# Patient Record
Sex: FEMALE | Race: WHITE | NOT HISPANIC OR LATINO | Employment: OTHER | URBAN - METROPOLITAN AREA
[De-identification: names, ages, dates, MRNs, and addresses within clinical notes are randomized per-mention and may not be internally consistent; named-entity substitution may affect disease eponyms.]

---

## 2022-05-06 ENCOUNTER — HOSPITAL ENCOUNTER (EMERGENCY)
Facility: CLINIC | Age: 47
Discharge: HOME | End: 2022-05-06
Attending: NURSE PRACTITIONER | Admitting: NURSE PRACTITIONER
Payer: COMMERCIAL

## 2022-05-06 VITALS
WEIGHT: 138 LBS | OXYGEN SATURATION: 99 % | RESPIRATION RATE: 12 BRPM | SYSTOLIC BLOOD PRESSURE: 128 MMHG | TEMPERATURE: 99.2 F | DIASTOLIC BLOOD PRESSURE: 94 MMHG | BODY MASS INDEX: 24.45 KG/M2 | HEIGHT: 63 IN | HEART RATE: 63 BPM

## 2022-05-06 DIAGNOSIS — G89.29 CHRONIC NONINTRACTABLE HEADACHE: ICD-10-CM

## 2022-05-06 DIAGNOSIS — R51.9 CHRONIC NONINTRACTABLE HEADACHE: ICD-10-CM

## 2022-05-06 PROBLEM — Z87.19 HISTORY OF IBS: Status: ACTIVE | Noted: 2021-08-04

## 2022-05-06 PROBLEM — F11.21 OPIOID USE DISORDER, SEVERE, IN SUSTAINED REMISSION (H): Status: ACTIVE | Noted: 2021-06-17

## 2022-05-06 PROBLEM — S92.504D: Status: ACTIVE | Noted: 2021-06-17

## 2022-05-06 PROBLEM — H91.92 DEAFNESS IN LEFT EAR: Status: ACTIVE | Noted: 2021-06-17

## 2022-05-06 PROBLEM — F33.9 RECURRENT MAJOR DEPRESSIVE DISORDER (H): Status: ACTIVE | Noted: 2021-06-17

## 2022-05-06 PROBLEM — I10 HYPERTENSION: Status: ACTIVE | Noted: 2021-06-17

## 2022-05-06 PROBLEM — F43.9 TRAUMA AND STRESSOR-RELATED DISORDER: Status: ACTIVE | Noted: 2021-08-09

## 2022-05-06 PROBLEM — F15.21 METHAMPHETAMINE USE DISORDER, SEVERE, IN SUSTAINED REMISSION (H): Status: ACTIVE | Noted: 2021-07-19

## 2022-05-06 PROCEDURE — 96374 THER/PROPH/DIAG INJ IV PUSH: CPT

## 2022-05-06 PROCEDURE — 258N000003 HC RX IP 258 OP 636: Performed by: NURSE PRACTITIONER

## 2022-05-06 PROCEDURE — 250N000011 HC RX IP 250 OP 636: Performed by: NURSE PRACTITIONER

## 2022-05-06 PROCEDURE — 99284 EMERGENCY DEPT VISIT MOD MDM: CPT | Mod: 25

## 2022-05-06 PROCEDURE — 96375 TX/PRO/DX INJ NEW DRUG ADDON: CPT

## 2022-05-06 PROCEDURE — 96361 HYDRATE IV INFUSION ADD-ON: CPT

## 2022-05-06 RX ORDER — KETOROLAC TROMETHAMINE 15 MG/ML
15 INJECTION, SOLUTION INTRAMUSCULAR; INTRAVENOUS ONCE
Status: COMPLETED | OUTPATIENT
Start: 2022-05-06 | End: 2022-05-06

## 2022-05-06 RX ORDER — DICYCLOMINE HYDROCHLORIDE 10 MG/1
10 CAPSULE ORAL 3 TIMES DAILY
COMMUNITY

## 2022-05-06 RX ORDER — MULTIVITAMIN WITH IRON
1 TABLET ORAL DAILY
COMMUNITY

## 2022-05-06 RX ORDER — DIPHENHYDRAMINE HYDROCHLORIDE 50 MG/ML
25 INJECTION INTRAMUSCULAR; INTRAVENOUS ONCE
Status: COMPLETED | OUTPATIENT
Start: 2022-05-06 | End: 2022-05-06

## 2022-05-06 RX ORDER — METOCLOPRAMIDE HYDROCHLORIDE 5 MG/ML
10 INJECTION INTRAMUSCULAR; INTRAVENOUS ONCE
Status: COMPLETED | OUTPATIENT
Start: 2022-05-06 | End: 2022-05-06

## 2022-05-06 RX ORDER — LISINOPRIL 10 MG/1
10 TABLET ORAL DAILY
COMMUNITY

## 2022-05-06 RX ORDER — ATORVASTATIN CALCIUM 10 MG/1
10 TABLET, FILM COATED ORAL DAILY
COMMUNITY

## 2022-05-06 RX ORDER — TRAZODONE HYDROCHLORIDE 100 MG/1
100 TABLET ORAL AT BEDTIME
COMMUNITY

## 2022-05-06 RX ADMIN — METOCLOPRAMIDE 10 MG: 5 INJECTION, SOLUTION INTRAMUSCULAR; INTRAVENOUS at 19:29

## 2022-05-06 RX ADMIN — KETOROLAC TROMETHAMINE 15 MG: 15 INJECTION, SOLUTION INTRAMUSCULAR; INTRAVENOUS at 20:15

## 2022-05-06 RX ADMIN — DIPHENHYDRAMINE HYDROCHLORIDE 25 MG: 50 INJECTION, SOLUTION INTRAMUSCULAR; INTRAVENOUS at 19:30

## 2022-05-06 RX ADMIN — SODIUM CHLORIDE 1000 ML: 9 INJECTION, SOLUTION INTRAVENOUS at 19:29

## 2022-05-06 ASSESSMENT — ENCOUNTER SYMPTOMS
HEADACHES: 1
NECK STIFFNESS: 0
SEIZURES: 0
TROUBLE SWALLOWING: 0
ARTHRALGIAS: 0
WEAKNESS: 1
CONFUSION: 0
NAUSEA: 0
MYALGIAS: 0
NECK PAIN: 0
NUMBNESS: 0
DIZZINESS: 0
FATIGUE: 0
SHORTNESS OF BREATH: 0
CHEST TIGHTNESS: 0
LIGHT-HEADEDNESS: 0
ABDOMINAL PAIN: 0
VOICE CHANGE: 0
DYSURIA: 0
CHILLS: 0
FEVER: 0
TREMORS: 0
PHOTOPHOBIA: 1
SPEECH DIFFICULTY: 0

## 2022-05-06 NOTE — ED NOTES
Bed: FT01  Expected date:   Expected time:   Means of arrival:   Comments:  Ashlee [id number] migraine eta 1803

## 2022-05-06 NOTE — ED PROVIDER NOTES
History     Chief Complaint:  No chief complaint on file.       [facility]   Nora Lujan is a 47 year old female who presents with headache.  She notes gradually worsening headache at 03 a.m. today.  She notes headache is similar to previous headaches.  Out of trazodone x 1 week.  Presents via EMS from home.  The patient lives in an apartment with her mother.  She notes she has daily headaches that are 6/10.  She notes she occasionally (1-2 times/month) gets headaches severe like today as a 8-9/10.  She notes she falls frequently but has not struck her head and has no change in this status.  She has had no fevers.  She took ibuprofen and Benadryl earlier today without significant relief.  Of note, her sister has COVID currently but the patient has not been around her family.    Review of Systems   Constitutional: Negative for chills, fatigue and fever.   HENT: Negative for congestion, ear pain, tinnitus, trouble swallowing and voice change.    Eyes: Positive for photophobia. Negative for visual disturbance.   Respiratory: Negative for chest tightness and shortness of breath.    Cardiovascular: Negative for chest pain.   Gastrointestinal: Negative for abdominal pain and nausea.   Genitourinary: Negative for dysuria.   Musculoskeletal: Negative for arthralgias, myalgias, neck pain and neck stiffness.   Skin: Negative for rash.   Neurological: Positive for weakness (chronic unchanged generalized) and headaches. Negative for dizziness, tremors, seizures, syncope, speech difficulty, light-headedness and numbness.   Psychiatric/Behavioral: Negative for confusion.   All other systems reviewed and are negative.    Allergies:  Compazine [Prochlorperazine]  Inapsine [Droperidol]  Codeine     Medications:    atorvastatin (LIPITOR) 10 MG tablet  dicyclomine (BENTYL) 10 MG capsule  lisinopril (ZESTRIL) 10 MG tablet  magnesium 250 MG tablet  traZODone (DESYREL) 100 MG tablet        Past Medical History:    No past  "medical history on file.  Patient Active Problem List   Diagnosis     Allergic rhinitis     Anxiety     Asthma     Bilateral occipital neuralgia     Closed nondisplaced fracture of lesser toe of right foot with routine healing     Deafness in left ear     Esophageal reflux     Cervicalgia     Chronic nonintractable headache     Fibromyalgia     History of IBS     Hyperlipidemia     Hypertension     Intractable chronic migraine without aura     Methamphetamine use disorder, severe, in sustained remission (H)     Opioid use disorder, severe, in sustained remission (H)     Other syndromes affecting cervical region     Recurrent major depressive disorder (H)     Tobacco dependence     Trauma and stressor-related disorder     Weakness of left side of body        Past Surgical History:    No past surgical history on file.     Family History:    family history is not on file.    Social History:  PCP: Satish Hill, [facility]    Physical Exam   Patient Vitals for the past 24 hrs:   BP Temp Temp src Pulse Resp SpO2 Height Weight   05/06/22 1816 (!) 141/92 99.2  F (37.3  C) Temporal 71 23 99 % 1.6 m (5' 3\") 62.6 kg (138 lb)        Physical Exam  Nursing notes reviewed. Vitals reviewed.  General: Alert. Well kept.  Eyes:  Conjunctiva non-injected, non-icteric.  Neck/Throat: Moist mucous membranes.  Normal voice.  No nuchal rigidity.  Cardiac: Regular rhythm. Normal heart sounds with no murmur/rubs/click.   Pulmonary: Clear and equal breath sounds bilaterally. No crackles/rales. No wheezing  Abdomen: Soft. Non-distended. Non-tender to palpation. No masses. No guarding or rebound.  Musculoskeletal: Normal gross range of motion of all 4 extremities.    Neurological: Alert and oriented x4.  Cranial nerves II through XII intact.  Skin: Warm and dry without rashes or petechiae. Normal appearance of visualized exposed skin.  Psych: Affect normal. Good eye contact.    Emergency Department Course     Emergency " Department Course:  Reviewed:  I reviewed nursing notes, vitals, past medical history and Care Everywhere    Assessments:  1816 I obtained history and examined the patient as noted above.   2050 I rechecked the patient and explained findings.  She notes she is feeling improved and is requesting discharge home.    Interventions:  Medications   metoclopramide (REGLAN) injection 10 mg (10 mg Intravenous Given 5/6/22 1929)   diphenhydrAMINE (BENADRYL) injection 25 mg (25 mg Intravenous Given 5/6/22 1930)   0.9% sodium chloride BOLUS (0 mLs Intravenous Stopped 5/6/22 2123)   ketorolac (TORADOL) injection 15 mg (15 mg Intravenous Given 5/6/22 2015)      Disposition:  The patient was discharged to home.     Impression & Plan      Covid-19  Nora Lujan was evaluated during a global COVID-19 pandemic, which necessitated consideration that the patient might be at risk for infection with the SARS-CoV-2 virus that causes COVID-19.   Applicable protocols for evaluation were followed during the patient's care.     Medical Decision Making:  Nora Lujan is a 47 year old female who presents with headache, similar to previous migraines.  The patient has not had any fever, weakness, numbness, paresthesia, neck stiffness or confusion. Meningitis, subarachnoid hemorrhage, CNS tumor, and stroke are considered as part of the differential, and considered unlikely. Neurologic exam is without focal deficits.  Low concern for bacterial infectious etiology given duration of symptoms, well appearance, no fevers.   Low concern for subarachnoid hemorrhage given duration of symptoms, absence of neck stiffness, absence of neurologic findings.  No trauma.  No indication for imagery or lab work. The pain has improved with medication interventions and she is requesting discharge home.  The patient should follow-up with her primary physician or neurologist within 3 days.  Return if increasing pain, fever, neck stiffness,  confusion, vomiting or weakness.      Diagnosis:    ICD-10-CM    1. Chronic nonintractable headache  R51.9     G89.29         Discharge Medications:  New Prescriptions    No medications on file        5/6/2022   Dresden, Cecille, ANDRE        DresdenCecille, CNP  05/07/22 0027

## 2022-05-06 NOTE — ED TRIAGE NOTES
Arrives via EMS from home. Complains of migraine headache which she has had before. Tried ibuprofen and benadryl at home. Normally would use some trazadone but apparently ran out. Reports vomiting and generalized weakness. EMS reports [id number]     Triage Assessment     Row Name 05/06/22 8841       Triage Assessment (Adult)    Airway WDL WDL       Respiratory WDL    Respiratory WDL WDL       Skin Circulation/Temperature WDL    Skin Circulation/Temperature WDL WDL       Cardiac WDL    Cardiac WDL WDL       Cognitive/Neuro/Behavioral WDL    Cognitive/Neuro/Behavioral WDL X  headache, photophobic

## 2022-08-08 ENCOUNTER — HOSPITAL ENCOUNTER (EMERGENCY)
Facility: CLINIC | Age: 47
Discharge: HOME | End: 2022-08-08
Attending: EMERGENCY MEDICINE | Admitting: EMERGENCY MEDICINE
Payer: COMMERCIAL

## 2022-08-08 VITALS
HEART RATE: 16 BPM | DIASTOLIC BLOOD PRESSURE: 56 MMHG | OXYGEN SATURATION: 98 % | TEMPERATURE: 99.4 F | SYSTOLIC BLOOD PRESSURE: 102 MMHG | RESPIRATION RATE: 20 BRPM

## 2022-08-08 DIAGNOSIS — G43.009 NONINTRACTABLE MIGRAINE, UNSPECIFIED MIGRAINE TYPE: ICD-10-CM

## 2022-08-08 PROCEDURE — 99284 EMERGENCY DEPT VISIT MOD MDM: CPT | Mod: 25

## 2022-08-08 PROCEDURE — 250N000011 HC RX IP 250 OP 636: Performed by: EMERGENCY MEDICINE

## 2022-08-08 PROCEDURE — 96375 TX/PRO/DX INJ NEW DRUG ADDON: CPT

## 2022-08-08 PROCEDURE — 96365 THER/PROPH/DIAG IV INF INIT: CPT

## 2022-08-08 RX ORDER — DEXAMETHASONE SODIUM PHOSPHATE 10 MG/ML
10 INJECTION, SOLUTION INTRAMUSCULAR; INTRAVENOUS ONCE
Status: COMPLETED | OUTPATIENT
Start: 2022-08-08 | End: 2022-08-08

## 2022-08-08 RX ORDER — MAGNESIUM SULFATE 1 G/100ML
1 INJECTION INTRAVENOUS ONCE
Status: COMPLETED | OUTPATIENT
Start: 2022-08-08 | End: 2022-08-08

## 2022-08-08 RX ORDER — KETOROLAC TROMETHAMINE 15 MG/ML
15 INJECTION, SOLUTION INTRAMUSCULAR; INTRAVENOUS ONCE
Status: COMPLETED | OUTPATIENT
Start: 2022-08-08 | End: 2022-08-08

## 2022-08-08 RX ORDER — METOCLOPRAMIDE HYDROCHLORIDE 5 MG/ML
10 INJECTION INTRAMUSCULAR; INTRAVENOUS ONCE
Status: COMPLETED | OUTPATIENT
Start: 2022-08-08 | End: 2022-08-08

## 2022-08-08 RX ORDER — DIPHENHYDRAMINE HYDROCHLORIDE 50 MG/ML
25 INJECTION INTRAMUSCULAR; INTRAVENOUS ONCE
Status: COMPLETED | OUTPATIENT
Start: 2022-08-08 | End: 2022-08-08

## 2022-08-08 RX ADMIN — KETOROLAC TROMETHAMINE 15 MG: 15 INJECTION, SOLUTION INTRAMUSCULAR; INTRAVENOUS at 08:52

## 2022-08-08 RX ADMIN — METOCLOPRAMIDE HYDROCHLORIDE 10 MG: 5 INJECTION INTRAMUSCULAR; INTRAVENOUS at 08:52

## 2022-08-08 RX ADMIN — DEXAMETHASONE SODIUM PHOSPHATE 10 MG: 10 INJECTION, SOLUTION INTRAMUSCULAR; INTRAVENOUS at 08:51

## 2022-08-08 RX ADMIN — MAGNESIUM SULFATE HEPTAHYDRATE 1 G: 1 INJECTION, SOLUTION INTRAVENOUS at 08:51

## 2022-08-08 RX ADMIN — DIPHENHYDRAMINE HYDROCHLORIDE 25 MG: 50 INJECTION, SOLUTION INTRAMUSCULAR; INTRAVENOUS at 08:51

## 2022-08-08 ASSESSMENT — ENCOUNTER SYMPTOMS
VOMITING: 1
NECK STIFFNESS: 0
DIARRHEA: 1
FEVER: 0
ABDOMINAL PAIN: 1
HEADACHES: 1
PHOTOPHOBIA: 1
WEAKNESS: 0

## 2022-08-08 NOTE — ED TRIAGE NOTES
Presents to the ED reporting a migraine x 2 days. Reports vomiting and photophobia in addition to the headache.

## 2022-08-08 NOTE — ED PROVIDER NOTES
History   Chief Complaint:  Headache       The history is provided by the patient.      Dot Lujan is a 47 year old female with history of migraines and stroke who presents with headache and photophobia. She reports her current headache and photophobia for the past 3 days, which is similar to her typical migraines. The patient reports pain on the left side from the back to the front, especially behind the left eye. She notes that the barometric pressure plays a part in her migraines, but is unsure of other triggers. She states that she has daily headaches, noting she was able to keep them at 5/10 or below for a while, but she is currently having 8/10 pain.  She notes abdominal pain, diarrhea, and vomiting as well. Her LMP was 2 weeks ago. She mentions that she had COVID-19 last week, but tested negative at home two days ago. The patient denies fevers. She reports that she is taking Tylenol, last around 0300 today, and benadryl at home for symptom management. The patient notes that the last time her headache was this severe was a couple weeks ago. She sees a neurologist.     Review of Systems   Constitutional: Negative for fever.   Eyes: Positive for photophobia.   Gastrointestinal: Positive for abdominal pain, diarrhea and vomiting.   Musculoskeletal: Negative for neck stiffness.   Neurological: Positive for headaches. Negative for weakness.   All other systems reviewed and are negative.    Allergies:  Compazine [Prochlorperazine]  Inapsine [Droperidol]  Codeine  Phenothiazines      Medications:  Atorvastatin  Bentyl  Lisinopril  Desyrel   Magnesium  Prozac   Neurontin  Aspirin  Albuterol     Past Medical History:     Allergic rhinitis  Anxiety  Bilateral occipital neuralgia  Deafness in left ear  Esophageal reflux  Cervicalgia  Fibromyalgia  IBS  Hyperlipidemia  Hypertension  Intractable chronic migraine with aura  Methamphetamine use disorder, severe, in sustained remission  Opioid use disorder,  severe, in sustained remission  Recurrent major depressive disorder  Tobacco dependence  Trauma and stressor-related disorder  Weakness of left side of body   Stroke   Asthma     Past Surgical History:     section  Ovarian cyst removal  Appendectomy     Family History:    Stroke   Headaches     Social History:  The patient presents to the ED alone.  Reports that she sees a neurologist.     Physical Exam     Patient Vitals for the past 24 hrs:   BP Temp Temp src Pulse Resp SpO2   22 1129 -- -- -- (!) 16 -- --   22 1054 -- -- -- -- -- 98 %   22 1015 102/56 -- -- 66 -- 99 %   22 0824 117/82 99.4  F (37.4  C) Temporal 85 20 97 %       Physical Exam  General: Alert, lying on gurney with lights turned off in room  Neuro:  PERRL.  EOMI.  No focal deficits  HEENT:  Moist mucous membranes. Conjunctiva normal.  No meningismus.  CV:  RRR, no m/r/g, skin warm and well perfused  Pulm:  CTAB, no wheezes/ronchi/rales.  No acute distress, breathing comfortably  GI:  Soft, nontender, nondistended.  No rebound or guarding.    MSK:  Moving all extremities.  No focal areas of edema, tenderness  Skin:  WWP, no rashes, skin color normal, no diaphoresis    Emergency Department Course     Emergency Department Course:     Reviewed:  I reviewed nursing notes, vitals, past medical history and Care Everywhere    Assessments:  0832 I obtained history and examined the patient as noted above.   1052 I rechecked and updated the patient. She reports her headache is improved. I feel that the patient is safe for discharge, and the patient agrees.     Interventions:  0851 Decadron 10 mg IV  0851 Benadryl 25 mg IV  0851 magnesium sulfate 1 g IV  0852 Toradol 15 mg IV  0852 Reglan 10 mg IV    Disposition:  The patient was discharged to home.     Impression & Plan     Medical Decision Making:  Dot Lujan is a 47 year old female who presents with a headache consistent with her typical migraine.  Evaluation  "in the emergency department has been negative. The patient has not had any fever, numbness, paresthesias, neck stiffness, thunderclap, \"worst ever\" character, worst at onset character, seizures, vision changes or confusion. Meningitis, pseudotumor, subarachnoid hemorrhage, CNS tumor, venous thrombosis and stroke are considered as part of the differential, and considered unlikely. There has been no trauma to suggest subdural hemorrhage.  I do not believe imaging is indicated at this time.  Her pain has improved with medication interventions here in the ED.  The patient should follow-up with her primary physician or neurologist regarding her current headache regimen and she is comfortable with this plan.  I recommended she return for worse pain, fever, vomiting, weakness, or any other concerns.  All questions answered and the patient was discharged home in stable condition.       Diagnosis:    ICD-10-CM    1. Nonintractable migraine, unspecified migraine type  G43.009        Scribe Disclosure:  I, Qi Cobb, am serving as a scribe at 8:29 AM on 8/8/2022 to document services personally performed by Patrick Gomez MD based on my observations and the provider's statements to me.        Patrick Gomez MD  08/08/22 1159    "

## 2022-09-16 ENCOUNTER — HOSPITAL ENCOUNTER (EMERGENCY)
Facility: CLINIC | Age: 47
Discharge: HOME | End: 2022-09-16
Attending: EMERGENCY MEDICINE | Admitting: EMERGENCY MEDICINE
Payer: COMMERCIAL

## 2022-09-16 VITALS
DIASTOLIC BLOOD PRESSURE: 76 MMHG | TEMPERATURE: 97.6 F | OXYGEN SATURATION: 99 % | RESPIRATION RATE: 20 BRPM | SYSTOLIC BLOOD PRESSURE: 128 MMHG | HEART RATE: 80 BPM

## 2022-09-16 DIAGNOSIS — R25.2 HAND CRAMPS: ICD-10-CM

## 2022-09-16 LAB
ANION GAP SERPL CALCULATED.3IONS-SCNC: 9 MMOL/L (ref 7–15)
BASOPHILS # BLD AUTO: 0 10E3/UL (ref 0–0.2)
BASOPHILS NFR BLD AUTO: 0 %
BUN SERPL-MCNC: 12.1 MG/DL (ref 6–20)
CALCIUM SERPL-MCNC: 9 MG/DL (ref 8.6–10)
CHLORIDE SERPL-SCNC: 103 MMOL/L (ref 98–107)
CK SERPL-CCNC: 76 U/L (ref 26–192)
CREAT SERPL-MCNC: 0.91 MG/DL (ref 0.51–0.95)
DEPRECATED HCO3 PLAS-SCNC: 23 MMOL/L (ref 22–29)
EOSINOPHIL # BLD AUTO: 0 10E3/UL (ref 0–0.7)
EOSINOPHIL NFR BLD AUTO: 0 %
ERYTHROCYTE [DISTWIDTH] IN BLOOD BY AUTOMATED COUNT: 23.1 % (ref 10–15)
GFR SERPL CREATININE-BSD FRML MDRD: 78 ML/MIN/1.73M2
GLUCOSE SERPL-MCNC: 97 MG/DL (ref 70–99)
HCT VFR BLD AUTO: 35.1 % (ref 35–47)
HGB BLD-MCNC: 10.6 G/DL (ref 11.7–15.7)
IMM GRANULOCYTES # BLD: 0 10E3/UL
IMM GRANULOCYTES NFR BLD: 0 %
LYMPHOCYTES # BLD AUTO: 2.1 10E3/UL (ref 0.8–5.3)
LYMPHOCYTES NFR BLD AUTO: 24 %
MAGNESIUM SERPL-MCNC: 2 MG/DL (ref 1.7–2.3)
MCH RBC QN AUTO: 22.5 PG (ref 26.5–33)
MCHC RBC AUTO-ENTMCNC: 30.2 G/DL (ref 31.5–36.5)
MCV RBC AUTO: 75 FL (ref 78–100)
MONOCYTES # BLD AUTO: 0.7 10E3/UL (ref 0–1.3)
MONOCYTES NFR BLD AUTO: 7 %
NEUTROPHILS # BLD AUTO: 6.1 10E3/UL (ref 1.6–8.3)
NEUTROPHILS NFR BLD AUTO: 69 %
NRBC # BLD AUTO: 0 10E3/UL
NRBC BLD AUTO-RTO: 0 /100
PLATELET # BLD AUTO: 331 10E3/UL (ref 150–450)
POTASSIUM SERPL-SCNC: 4.2 MMOL/L (ref 3.4–5.3)
RBC # BLD AUTO: 4.71 10E6/UL (ref 3.8–5.2)
SODIUM SERPL-SCNC: 135 MMOL/L (ref 136–145)
WBC # BLD AUTO: 9 10E3/UL (ref 4–11)

## 2022-09-16 PROCEDURE — 82310 ASSAY OF CALCIUM: CPT | Performed by: EMERGENCY MEDICINE

## 2022-09-16 PROCEDURE — 85014 HEMATOCRIT: CPT | Performed by: EMERGENCY MEDICINE

## 2022-09-16 PROCEDURE — 250N000011 HC RX IP 250 OP 636: Performed by: EMERGENCY MEDICINE

## 2022-09-16 PROCEDURE — 82550 ASSAY OF CK (CPK): CPT | Performed by: EMERGENCY MEDICINE

## 2022-09-16 PROCEDURE — 96374 THER/PROPH/DIAG INJ IV PUSH: CPT

## 2022-09-16 PROCEDURE — 99284 EMERGENCY DEPT VISIT MOD MDM: CPT | Mod: 25

## 2022-09-16 PROCEDURE — 258N000003 HC RX IP 258 OP 636: Performed by: EMERGENCY MEDICINE

## 2022-09-16 PROCEDURE — 96361 HYDRATE IV INFUSION ADD-ON: CPT

## 2022-09-16 PROCEDURE — 36415 COLL VENOUS BLD VENIPUNCTURE: CPT | Performed by: EMERGENCY MEDICINE

## 2022-09-16 PROCEDURE — 83735 ASSAY OF MAGNESIUM: CPT | Performed by: EMERGENCY MEDICINE

## 2022-09-16 RX ORDER — KETOROLAC TROMETHAMINE 15 MG/ML
15 INJECTION, SOLUTION INTRAMUSCULAR; INTRAVENOUS ONCE
Status: COMPLETED | OUTPATIENT
Start: 2022-09-16 | End: 2022-09-16

## 2022-09-16 RX ADMIN — SODIUM CHLORIDE 1000 ML: 9 INJECTION, SOLUTION INTRAVENOUS at 19:59

## 2022-09-16 RX ADMIN — KETOROLAC TROMETHAMINE 15 MG: 15 INJECTION, SOLUTION INTRAMUSCULAR; INTRAVENOUS at 20:23

## 2022-09-16 ASSESSMENT — ENCOUNTER SYMPTOMS
MYALGIAS: 1
FEVER: 0
CHILLS: 0

## 2022-09-16 ASSESSMENT — ACTIVITIES OF DAILY LIVING (ADL): ADLS_ACUITY_SCORE: 35

## 2022-09-16 NOTE — ED TRIAGE NOTES
Pt arrives with c/o bilateral hand pain and spasms. Pt reports she woke up this morning and noticed it in her right hand, later on she went to smoke and it went to her left hand started having pain and spasm too. Pt initially reports she can't open her hands but finger easily straightened out to place pulse ox for vitals.       Triage Assessment     Row Name 09/16/22 3598       Triage Assessment (Adult)    Airway WDL WDL       Respiratory WDL    Respiratory WDL WDL       Skin Circulation/Temperature WDL    Skin Circulation/Temperature WDL WDL       Cardiac WDL    Cardiac WDL WDL       Peripheral/Neurovascular WDL    Peripheral Neurovascular WDL WDL       Cognitive/Neuro/Behavioral WDL    Cognitive/Neuro/Behavioral WDL WDL

## 2022-09-17 NOTE — ED PROVIDER NOTES
History   Chief Complaint:  Hand Pain       The history is provided by the patient.      Dot Lujan is a 47 year old female with history of arthritis who presents with bilateral hand pain. For the past week, he right ring and pinky finger have been spasming. Then when she woke up this morning, her entire right hand was spasming. Shortly afterwards, her entire left hand began to spasm. It has been consistent for the entire day, she is having difficultly opening her hands. Her oral intake has been appropriate lately, but was a little of at the beginning of the week due to a headache. She denies fevers or chills.     Review of Systems   Constitutional: Negative for chills and fever.   Musculoskeletal: Positive for myalgias (BIlateral hands).   All other systems reviewed and are negative.    Allergies:  Compazine [Prochlorperazine]  Inapsine [Droperidol]  Codeine    Medications:  Lipitor   Bentyl   Zestril   Desyrel  Albuterol      Past Medical History:     Asthma   Bilateral occipital neuralgia   Deafness in left ear  Esophageal reflux  Cervicalgia  Chronic nonintractable headache  Fibromyalgia  IBS  Hyperlipidemia  Hypertension  Intractable chronic migraine without aura  Methamphetamine use disorder, severe, in sustained remission (H)  Opioid use disorder, severe, in sustained remission (H)  Other syndromes affecting cervical region  Recurrent major depressive disorder (H)  Tobacco dependence  Trauma and stressor-related disorder  AG  Hx of suicide attempt     Past Surgical History:    Appendectomy      Ovarian cyst removal     Family History:    The patient denies past family history.     Social History:  The patient presents to the ED alone via private vehicle   PCP: Satish Hill   Hx of alcohol use, tobacco use, and methamphetamine abuse     Physical Exam     Patient Vitals for the past 24 hrs:   BP Temp Temp src Pulse Resp SpO2   22 1800 131/77 97.6  F (36.4  C) Temporal 81 20  98 %       Physical Exam  General: Patient is alert, awake and interactive when I enter the room  Head: The scalp, face, and head appear normal  Eyes: Conjunctivae are normal  ENT: The nose is normal, Pinnae are normal, External acoustic canals are normal  Neck: Trachea midline  CV: Pulses are normal.   Resp: No respiratory distress   Musc: Able to range fingers and wrist normally bilaterally.  No significant carpopedal spasm.  Intact peripheral pulses.  Sensation in hands bilaterally is normal.  Skin: No rash or lesions noted  Neuro: CN's II-XII without obvious abnormality.    5/5 UE and LE strength which is symmetrical.   Light touch is symmetrical bilateral UE's and LE's.    PERRLA, EOMI.    No meningismus and full neck AROM/PROM.   Psych: Normal affect.  Appropriate interactions.    Emergency Department Course   Laboratory:  Labs Ordered and Resulted from Time of ED Arrival to Time of ED Departure   BASIC METABOLIC PANEL - Abnormal       Result Value    Sodium 135 (*)     Potassium 4.2      Chloride 103      Carbon Dioxide (CO2) 23      Anion Gap 9      Urea Nitrogen 12.1      Creatinine 0.91      Calcium 9.0      Glucose 97      GFR Estimate 78     CBC WITH PLATELETS AND DIFFERENTIAL - Abnormal    WBC Count 9.0      RBC Count 4.71      Hemoglobin 10.6 (*)     Hematocrit 35.1      MCV 75 (*)     MCH 22.5 (*)     MCHC 30.2 (*)     RDW 23.1 (*)     Platelet Count 331      % Neutrophils 69      % Lymphocytes 24      % Monocytes 7      % Eosinophils 0      % Basophils 0      % Immature Granulocytes 0      NRBCs per 100 WBC 0      Absolute Neutrophils 6.1      Absolute Lymphocytes 2.1      Absolute Monocytes 0.7      Absolute Eosinophils 0.0      Absolute Basophils 0.0      Absolute Immature Granulocytes 0.0      Absolute NRBCs 0.0     MAGNESIUM - Normal    Magnesium 2.0     CK TOTAL - Normal    CK 76        Emergency Department Course:    Reviewed:  I reviewed nursing notes, vitals and past medical  history    Assessments:  1950 I obtained history and examined the patient as noted above.   2046 I rechecked the patient and explained findings. I discussed plan for discharge home.    Interventions:  1959 NS 1 L IV   2023 Toradol 15 mg IV     Disposition:  The patient was discharged to home.     Impression & Plan   Medical Decision Making:  Patient is a 47-year-old female who presents emergency department with bilateral hand pain.  Patient may have been experiencing some carpopedal spasms earlier in the day however this appears to be resolved.  Electrolytes are within normal limits.  CK is normal.  No evidence of rhabdomyolysis.  This does not appear to be a central neurological pathology given the overall reassuring neurologic examination.  Patient was treated with the aforementioned interventions with significant improvement of her symptoms.  She will follow-up with her primary care physician and return to the emergency department any new or worsening symptoms.    Diagnosis:    ICD-10-CM    1. Hand cramps  R25.2        Scribe Disclosure:  I, Richardson Layton, am serving as a scribe at 7:49 PM on 9/16/2022 to document services personally performed by Charles Pham MD based on my observations and the provider's statements to me.        Charles Pham MD  09/17/22 0021

## 2022-10-15 ENCOUNTER — HOSPITAL ENCOUNTER (EMERGENCY)
Facility: CLINIC | Age: 47
Discharge: HOME | End: 2022-10-15
Attending: EMERGENCY MEDICINE | Admitting: EMERGENCY MEDICINE
Payer: COMMERCIAL

## 2022-10-15 VITALS
RESPIRATION RATE: 16 BRPM | SYSTOLIC BLOOD PRESSURE: 111 MMHG | WEIGHT: 135 LBS | HEART RATE: 65 BPM | BODY MASS INDEX: 23.91 KG/M2 | DIASTOLIC BLOOD PRESSURE: 66 MMHG | OXYGEN SATURATION: 100 % | TEMPERATURE: 97.4 F

## 2022-10-15 DIAGNOSIS — R51.9 NONINTRACTABLE HEADACHE, UNSPECIFIED CHRONICITY PATTERN, UNSPECIFIED HEADACHE TYPE: ICD-10-CM

## 2022-10-15 PROCEDURE — 96365 THER/PROPH/DIAG IV INF INIT: CPT

## 2022-10-15 PROCEDURE — 258N000003 HC RX IP 258 OP 636: Performed by: EMERGENCY MEDICINE

## 2022-10-15 PROCEDURE — 96375 TX/PRO/DX INJ NEW DRUG ADDON: CPT

## 2022-10-15 PROCEDURE — 99285 EMERGENCY DEPT VISIT HI MDM: CPT | Mod: 25

## 2022-10-15 PROCEDURE — 250N000011 HC RX IP 250 OP 636: Performed by: EMERGENCY MEDICINE

## 2022-10-15 RX ORDER — SODIUM CHLORIDE 9 MG/ML
INJECTION, SOLUTION INTRAVENOUS CONTINUOUS
Status: DISCONTINUED | OUTPATIENT
Start: 2022-10-15 | End: 2022-10-15 | Stop reason: HOSPADM

## 2022-10-15 RX ORDER — KETOROLAC TROMETHAMINE 15 MG/ML
15 INJECTION, SOLUTION INTRAMUSCULAR; INTRAVENOUS ONCE
Status: COMPLETED | OUTPATIENT
Start: 2022-10-15 | End: 2022-10-15

## 2022-10-15 RX ORDER — MAGNESIUM SULFATE HEPTAHYDRATE 40 MG/ML
2 INJECTION, SOLUTION INTRAVENOUS ONCE
Status: COMPLETED | OUTPATIENT
Start: 2022-10-15 | End: 2022-10-15

## 2022-10-15 RX ORDER — HYDROMORPHONE HYDROCHLORIDE 1 MG/ML
0.5 INJECTION, SOLUTION INTRAMUSCULAR; INTRAVENOUS; SUBCUTANEOUS ONCE
Status: COMPLETED | OUTPATIENT
Start: 2022-10-15 | End: 2022-10-15

## 2022-10-15 RX ORDER — DIPHENHYDRAMINE HYDROCHLORIDE 50 MG/ML
25 INJECTION INTRAMUSCULAR; INTRAVENOUS ONCE
Status: COMPLETED | OUTPATIENT
Start: 2022-10-15 | End: 2022-10-15

## 2022-10-15 RX ORDER — METOCLOPRAMIDE HYDROCHLORIDE 5 MG/ML
10 INJECTION INTRAMUSCULAR; INTRAVENOUS ONCE
Status: COMPLETED | OUTPATIENT
Start: 2022-10-15 | End: 2022-10-15

## 2022-10-15 RX ADMIN — SODIUM CHLORIDE 1000 ML: 9 INJECTION, SOLUTION INTRAVENOUS at 17:19

## 2022-10-15 RX ADMIN — DIPHENHYDRAMINE HYDROCHLORIDE 25 MG: 50 INJECTION, SOLUTION INTRAMUSCULAR; INTRAVENOUS at 17:19

## 2022-10-15 RX ADMIN — KETOROLAC TROMETHAMINE 15 MG: 15 INJECTION, SOLUTION INTRAMUSCULAR; INTRAVENOUS at 17:18

## 2022-10-15 RX ADMIN — HYDROMORPHONE HYDROCHLORIDE 0.5 MG: 1 INJECTION, SOLUTION INTRAMUSCULAR; INTRAVENOUS; SUBCUTANEOUS at 18:10

## 2022-10-15 RX ADMIN — METOCLOPRAMIDE HYDROCHLORIDE 10 MG: 5 INJECTION INTRAMUSCULAR; INTRAVENOUS at 17:18

## 2022-10-15 RX ADMIN — MAGNESIUM SULFATE HEPTAHYDRATE 2 G: 40 INJECTION, SOLUTION INTRAVENOUS at 17:19

## 2022-10-15 ASSESSMENT — ENCOUNTER SYMPTOMS
HEADACHES: 1
FEVER: 0
COUGH: 0
DIARRHEA: 0
PHOTOPHOBIA: 1
VOMITING: 0

## 2022-10-15 ASSESSMENT — ACTIVITIES OF DAILY LIVING (ADL): ADLS_ACUITY_SCORE: 33

## 2022-10-15 NOTE — ED TRIAGE NOTES
"Headache. States she has a headache every day. States she used to take Topamax, but doesn't take any longer because she \"sees streamers\" when she uses it. Patient states her headache started last night. Does have photophobia and vomiting. Describes that when she blew her nose she saw a little blood. Notes she is a little spacy.    Pt is alert and oriented times four. Presented to the ER by private care with a friend.       "

## 2022-10-15 NOTE — ED PROVIDER NOTES
History   Chief Complaint:  Headache       The history is provided by the patient.      Dot Lujan is a 47 year old female with history of recurrent headaches after a stroke in her 20s who presents with a headache. She provides that she has had this everyday since she suffered a stroke in her 20s alongside left sided weakness. This headache currently started last night with associated photophobia, visual blurriness, and epistaxis, which is typical for her migraines. She was prescribed Topamax by her Neurologist to take for these episodes, however, she stopped taking this because she would start seeing streamers in her vision. She denies having any fever, new cough, vomiting, or diarrhea. No new numbness/weakness, double vision. She is not on blood thinners and denies any traumas or injuries.    Review of Systems   Constitutional: Negative for fever.   HENT: Positive for nosebleeds.    Eyes: Positive for photophobia and visual disturbance.   Respiratory: Negative for cough.    Gastrointestinal: Negative for diarrhea and vomiting.   Neurological: Positive for headaches.   All other systems reviewed and are negative.    Allergies:  Compazine [Prochlorperazine]  Inapsine [Droperidol]  Codeine  Phenothiazines    Medications:  Lipitor  Bentyl  Zestril  Magnesium  Desyrel  Topamax  Prozac  Epinephrine    Past Medical History:     Allergic rhinitis  Anxiety  Asthma  Bilateral occipital neuralgia  Closed nondisplaced fracture of lesser toe of right foot  Deafness in left ear  Esophageal reflux  Cervicalgia  Chronic non intractable headache  Fibromyalgia  IBS  Hyperlipidemia  Hypertension  Intractable chronic migraine without aura  Methamphetamine use disorder  Opioid use disorder  Recurrent depression  Tobacco dependence  Trauma and stressor related disorder  Weakness of left side of body  CVA   GERD  AG  Suicide attempt    Past Surgical History:     section  Ovarian cyst  removal  Appendectomy    Family History:    Stroke    Social History:  Patient came from home.  Patient is unaccompanied in the ED.  PCP: Satish Hill     Physical Exam     Patient Vitals for the past 24 hrs:   BP Temp Temp src Pulse Resp SpO2 Weight   10/15/22 1800 108/67 -- -- 64 -- 99 % --   10/15/22 1745 -- -- -- -- -- 100 % --   10/15/22 1730 106/66 -- -- 62 -- 98 % --   10/15/22 1715 119/67 -- -- 60 -- 98 % --   10/15/22 1700 103/58 -- -- 61 -- 98 % --   10/15/22 1645 121/72 -- -- 65 -- 100 % --   10/15/22 1350 -- -- -- -- -- -- 61.2 kg (135 lb)   10/15/22 1349 111/80 97.4  F (36.3  C) Temporal 80 16 99 % --       Physical Exam  VS: Reviewed per above  HENT: Mucous membranes moist, no nuchal rigidity  EYES: sclera anicteric  CV: Rate as noted, regular rhythm.   RESP: Effort normal. Breath sounds are normal bilaterally.  GI: no tenderness/rebound/guarding, not distended.  NEURO: GCS 15, cranial nerves II through XII are intact, 5 out of 5 strength in all 4 extremities, sensation is intact light touch in all 4 extremities  MSK: No deformity of the extremities  SKIN: Warm and dry    Emergency Department Course     Emergency Department Course:       Reviewed:  I reviewed nursing notes, vitals, past medical history and Care Everywhere    Assessments:  1642 I obtained history and examined the patient as noted above.   1753 I rechecked the patient and explained findings.     Interventions:  1718 Reglan 10 mg IV  1718 Toradol 15 mg IV  1719 NS 1000 mL IV  1719 Benadryl 25 mg IV  1719 Magnesium sulfate 2 g IV   Dilaudid 0.5 mg IV    Disposition:  The patient was discharged to home.     Impression & Plan     CMS Diagnoses: None    Medical Decision Making:  Dot Lujan is a 47 year old female who presents with a headache consistent with her typical migraine.  Evaluation in the emergency department has been negative. The patient has not had any fever, weakness, numbness, paresthesias, neck stiffness,  seizures, vision changes (aside from blurred vision) or confusion. The headache was not worst at onset, worst headache of life, nor thunderclap in nature. Meningitis, pseudotumor, subarachnoid hemorrhage, CNS tumor, venous thrombosis and stroke are considered as part of the differential, and considered unlikely. There has been no trauma to suggest intracranial hemorrhage. I do not believe imaging is indicated at this time.  Her pain has improved with medication interventions.  The patient should follow-up with her primary physician or neurology.  I recommended she return for worse pain, fever, vomiting, weakness, or any other concerns.        Diagnosis:    ICD-10-CM    1. Nonintractable headache, unspecified chronicity pattern, unspecified headache type  R51.9           Discharge Medications:  New Prescriptions    No medications on file       Scribe Disclosure:  I, Darell Warner, am serving as a scribe at 4:41 PM on 10/15/2022 to document services personally performed by Mauricio Smith MD based on my observations and the provider's statements to me.        Mauricio Smith MD  10/15/22 9660

## 2023-02-07 ENCOUNTER — HOSPITAL ENCOUNTER (EMERGENCY)
Facility: CLINIC | Age: 48
Discharge: HOME | End: 2023-02-07
Attending: EMERGENCY MEDICINE | Admitting: EMERGENCY MEDICINE
Payer: COMMERCIAL

## 2023-02-07 VITALS
OXYGEN SATURATION: 95 % | DIASTOLIC BLOOD PRESSURE: 64 MMHG | TEMPERATURE: 98.1 F | HEART RATE: 61 BPM | SYSTOLIC BLOOD PRESSURE: 109 MMHG | RESPIRATION RATE: 18 BRPM

## 2023-02-07 DIAGNOSIS — R51.9 ACUTE NONINTRACTABLE HEADACHE, UNSPECIFIED HEADACHE TYPE: ICD-10-CM

## 2023-02-07 LAB
HOLD SPECIMEN: NORMAL

## 2023-02-07 PROCEDURE — 258N000003 HC RX IP 258 OP 636: Performed by: EMERGENCY MEDICINE

## 2023-02-07 PROCEDURE — 99285 EMERGENCY DEPT VISIT HI MDM: CPT | Mod: 25

## 2023-02-07 PROCEDURE — 96361 HYDRATE IV INFUSION ADD-ON: CPT

## 2023-02-07 PROCEDURE — 96365 THER/PROPH/DIAG IV INF INIT: CPT

## 2023-02-07 PROCEDURE — 250N000011 HC RX IP 250 OP 636: Performed by: EMERGENCY MEDICINE

## 2023-02-07 PROCEDURE — 96375 TX/PRO/DX INJ NEW DRUG ADDON: CPT

## 2023-02-07 RX ORDER — METOCLOPRAMIDE HYDROCHLORIDE 5 MG/ML
10 INJECTION INTRAMUSCULAR; INTRAVENOUS ONCE
Status: COMPLETED | OUTPATIENT
Start: 2023-02-07 | End: 2023-02-07

## 2023-02-07 RX ORDER — HYDROMORPHONE HYDROCHLORIDE 1 MG/ML
0.5 INJECTION, SOLUTION INTRAMUSCULAR; INTRAVENOUS; SUBCUTANEOUS ONCE
Status: COMPLETED | OUTPATIENT
Start: 2023-02-07 | End: 2023-02-07

## 2023-02-07 RX ORDER — KETOROLAC TROMETHAMINE 15 MG/ML
15 INJECTION, SOLUTION INTRAMUSCULAR; INTRAVENOUS ONCE
Status: COMPLETED | OUTPATIENT
Start: 2023-02-07 | End: 2023-02-07

## 2023-02-07 RX ORDER — MAGNESIUM SULFATE HEPTAHYDRATE 40 MG/ML
2 INJECTION, SOLUTION INTRAVENOUS ONCE
Status: COMPLETED | OUTPATIENT
Start: 2023-02-07 | End: 2023-02-07

## 2023-02-07 RX ORDER — DIPHENHYDRAMINE HYDROCHLORIDE 50 MG/ML
12.5 INJECTION INTRAMUSCULAR; INTRAVENOUS ONCE
Status: COMPLETED | OUTPATIENT
Start: 2023-02-07 | End: 2023-02-07

## 2023-02-07 RX ORDER — DEXAMETHASONE SODIUM PHOSPHATE 10 MG/ML
10 INJECTION, SOLUTION INTRAMUSCULAR; INTRAVENOUS ONCE
Status: COMPLETED | OUTPATIENT
Start: 2023-02-07 | End: 2023-02-07

## 2023-02-07 RX ADMIN — MAGNESIUM SULFATE HEPTAHYDRATE 2 G: 40 INJECTION, SOLUTION INTRAVENOUS at 21:16

## 2023-02-07 RX ADMIN — DIPHENHYDRAMINE HYDROCHLORIDE 12.5 MG: 50 INJECTION, SOLUTION INTRAMUSCULAR; INTRAVENOUS at 19:27

## 2023-02-07 RX ADMIN — HYDROMORPHONE HYDROCHLORIDE 0.5 MG: 1 INJECTION, SOLUTION INTRAMUSCULAR; INTRAVENOUS; SUBCUTANEOUS at 21:15

## 2023-02-07 RX ADMIN — KETOROLAC TROMETHAMINE 15 MG: 15 INJECTION, SOLUTION INTRAMUSCULAR; INTRAVENOUS at 19:28

## 2023-02-07 RX ADMIN — DEXAMETHASONE SODIUM PHOSPHATE 10 MG: 10 INJECTION, SOLUTION INTRAMUSCULAR; INTRAVENOUS at 21:14

## 2023-02-07 RX ADMIN — SODIUM CHLORIDE 1000 ML: 9 INJECTION, SOLUTION INTRAVENOUS at 19:27

## 2023-02-07 RX ADMIN — METOCLOPRAMIDE HYDROCHLORIDE 10 MG: 5 INJECTION INTRAMUSCULAR; INTRAVENOUS at 19:27

## 2023-02-07 ASSESSMENT — ENCOUNTER SYMPTOMS
FEVER: 0
COUGH: 0
WEAKNESS: 1
NAUSEA: 1
HEADACHES: 1
SORE THROAT: 0
VOMITING: 0
SHORTNESS OF BREATH: 0
DIARRHEA: 1
PHOTOPHOBIA: 1

## 2023-02-07 ASSESSMENT — ACTIVITIES OF DAILY LIVING (ADL)
ADLS_ACUITY_SCORE: 35

## 2023-02-07 NOTE — ED TRIAGE NOTES
"Patient reports she woke last night with a 8/10 migraine headache. Patient states that she has a hx of migraines and this \"kind of\" feels like a normal migraine, patient took ibuprofen and benadryl at home at 3:30pm     Triage Assessment     Row Name 02/07/23 2191       Triage Assessment (Adult)    Airway WDL WDL       Respiratory WDL    Respiratory WDL WDL       Skin Circulation/Temperature WDL    Skin Circulation/Temperature WDL WDL       Cardiac WDL    Cardiac WDL WDL       Peripheral/Neurovascular WDL    Peripheral Neurovascular WDL WDL       Cognitive/Neuro/Behavioral WDL    Cognitive/Neuro/Behavioral WDL WDL              "

## 2023-02-08 ENCOUNTER — APPOINTMENT (OUTPATIENT)
Dept: CT IMAGING | Facility: CLINIC | Age: 48
End: 2023-02-08
Attending: EMERGENCY MEDICINE
Payer: COMMERCIAL

## 2023-02-08 ENCOUNTER — APPOINTMENT (OUTPATIENT)
Dept: GENERAL RADIOLOGY | Facility: CLINIC | Age: 48
End: 2023-02-08
Attending: EMERGENCY MEDICINE
Payer: COMMERCIAL

## 2023-02-08 ENCOUNTER — HOSPITAL ENCOUNTER (EMERGENCY)
Facility: CLINIC | Age: 48
Discharge: HOME | End: 2023-02-08
Attending: EMERGENCY MEDICINE | Admitting: EMERGENCY MEDICINE
Payer: COMMERCIAL

## 2023-02-08 VITALS
SYSTOLIC BLOOD PRESSURE: 116 MMHG | DIASTOLIC BLOOD PRESSURE: 58 MMHG | HEART RATE: 69 BPM | TEMPERATURE: 99.3 F | RESPIRATION RATE: 18 BRPM | OXYGEN SATURATION: 96 %

## 2023-02-08 DIAGNOSIS — G89.29 CHRONIC INTRACTABLE HEADACHE, UNSPECIFIED HEADACHE TYPE: ICD-10-CM

## 2023-02-08 DIAGNOSIS — R51.9 CHRONIC INTRACTABLE HEADACHE, UNSPECIFIED HEADACHE TYPE: ICD-10-CM

## 2023-02-08 LAB
ANION GAP SERPL CALCULATED.3IONS-SCNC: 16 MMOL/L (ref 7–15)
BASOPHILS # BLD AUTO: 0 10E3/UL (ref 0–0.2)
BASOPHILS NFR BLD AUTO: 0 %
BUN SERPL-MCNC: 9.9 MG/DL (ref 6–20)
CALCIUM SERPL-MCNC: 9 MG/DL (ref 8.6–10)
CHLORIDE SERPL-SCNC: 106 MMOL/L (ref 98–107)
CREAT SERPL-MCNC: 0.69 MG/DL (ref 0.51–0.95)
DEPRECATED HCO3 PLAS-SCNC: 17 MMOL/L (ref 22–29)
EOSINOPHIL # BLD AUTO: 0 10E3/UL (ref 0–0.7)
EOSINOPHIL NFR BLD AUTO: 0 %
ERYTHROCYTE [DISTWIDTH] IN BLOOD BY AUTOMATED COUNT: 14.7 % (ref 10–15)
GFR SERPL CREATININE-BSD FRML MDRD: >90 ML/MIN/1.73M2
GLUCOSE SERPL-MCNC: 138 MG/DL (ref 70–99)
HCT VFR BLD AUTO: 43.1 % (ref 35–47)
HGB BLD-MCNC: 14.4 G/DL (ref 11.7–15.7)
HOLD SPECIMEN: NORMAL
IMM GRANULOCYTES # BLD: 0.1 10E3/UL
IMM GRANULOCYTES NFR BLD: 1 %
LYMPHOCYTES # BLD AUTO: 3.1 10E3/UL (ref 0.8–5.3)
LYMPHOCYTES NFR BLD AUTO: 14 %
MCH RBC QN AUTO: 29.1 PG (ref 26.5–33)
MCHC RBC AUTO-ENTMCNC: 33.4 G/DL (ref 31.5–36.5)
MCV RBC AUTO: 87 FL (ref 78–100)
MONOCYTES # BLD AUTO: 1.6 10E3/UL (ref 0–1.3)
MONOCYTES NFR BLD AUTO: 7 %
NEUTROPHILS # BLD AUTO: 17.6 10E3/UL (ref 1.6–8.3)
NEUTROPHILS NFR BLD AUTO: 78 %
NRBC # BLD AUTO: 0 10E3/UL
NRBC BLD AUTO-RTO: 0 /100
PLATELET # BLD AUTO: 345 10E3/UL (ref 150–450)
POTASSIUM SERPL-SCNC: 3.4 MMOL/L (ref 3.4–5.3)
RBC # BLD AUTO: 4.94 10E6/UL (ref 3.8–5.2)
SODIUM SERPL-SCNC: 139 MMOL/L (ref 136–145)
TROPONIN T SERPL HS-MCNC: <6 NG/L
WBC # BLD AUTO: 22.4 10E3/UL (ref 4–11)

## 2023-02-08 PROCEDURE — 71046 X-RAY EXAM CHEST 2 VIEWS: CPT

## 2023-02-08 PROCEDURE — 99285 EMERGENCY DEPT VISIT HI MDM: CPT | Mod: 25

## 2023-02-08 PROCEDURE — 250N000011 HC RX IP 250 OP 636: Performed by: EMERGENCY MEDICINE

## 2023-02-08 PROCEDURE — 84484 ASSAY OF TROPONIN QUANT: CPT | Performed by: EMERGENCY MEDICINE

## 2023-02-08 PROCEDURE — 96366 THER/PROPH/DIAG IV INF ADDON: CPT

## 2023-02-08 PROCEDURE — 82310 ASSAY OF CALCIUM: CPT | Performed by: EMERGENCY MEDICINE

## 2023-02-08 PROCEDURE — 36415 COLL VENOUS BLD VENIPUNCTURE: CPT | Performed by: EMERGENCY MEDICINE

## 2023-02-08 PROCEDURE — 258N000003 HC RX IP 258 OP 636: Performed by: EMERGENCY MEDICINE

## 2023-02-08 PROCEDURE — 93005 ELECTROCARDIOGRAM TRACING: CPT

## 2023-02-08 PROCEDURE — 85025 COMPLETE CBC W/AUTO DIFF WBC: CPT | Performed by: EMERGENCY MEDICINE

## 2023-02-08 PROCEDURE — 70450 CT HEAD/BRAIN W/O DYE: CPT

## 2023-02-08 PROCEDURE — 250N000009 HC RX 250: Performed by: EMERGENCY MEDICINE

## 2023-02-08 PROCEDURE — 96365 THER/PROPH/DIAG IV INF INIT: CPT

## 2023-02-08 PROCEDURE — 96375 TX/PRO/DX INJ NEW DRUG ADDON: CPT

## 2023-02-08 RX ORDER — ONDANSETRON 4 MG/1
4 TABLET, ORALLY DISINTEGRATING ORAL ONCE
Status: COMPLETED | OUTPATIENT
Start: 2023-02-08 | End: 2023-02-08

## 2023-02-08 RX ORDER — DIPHENHYDRAMINE HYDROCHLORIDE 50 MG/ML
25 INJECTION INTRAMUSCULAR; INTRAVENOUS ONCE
Status: COMPLETED | OUTPATIENT
Start: 2023-02-08 | End: 2023-02-08

## 2023-02-08 RX ORDER — METOCLOPRAMIDE HYDROCHLORIDE 5 MG/ML
10 INJECTION INTRAMUSCULAR; INTRAVENOUS ONCE
Status: COMPLETED | OUTPATIENT
Start: 2023-02-08 | End: 2023-02-08

## 2023-02-08 RX ORDER — OLANZAPINE 10 MG/1
5 INJECTION, POWDER, LYOPHILIZED, FOR SOLUTION INTRAMUSCULAR ONCE
Status: COMPLETED | OUTPATIENT
Start: 2023-02-08 | End: 2023-02-08

## 2023-02-08 RX ORDER — KETOROLAC TROMETHAMINE 15 MG/ML
15 INJECTION, SOLUTION INTRAMUSCULAR; INTRAVENOUS ONCE
Status: DISCONTINUED | OUTPATIENT
Start: 2023-02-08 | End: 2023-02-09 | Stop reason: HOSPADM

## 2023-02-08 RX ORDER — MAGNESIUM SULFATE HEPTAHYDRATE 40 MG/ML
2 INJECTION, SOLUTION INTRAVENOUS ONCE
Status: COMPLETED | OUTPATIENT
Start: 2023-02-08 | End: 2023-02-08

## 2023-02-08 RX ADMIN — ONDANSETRON 4 MG: 4 TABLET, ORALLY DISINTEGRATING ORAL at 18:22

## 2023-02-08 RX ADMIN — SODIUM CHLORIDE 1000 ML: 9 INJECTION, SOLUTION INTRAVENOUS at 19:38

## 2023-02-08 RX ADMIN — DIPHENHYDRAMINE HYDROCHLORIDE 25 MG: 50 INJECTION, SOLUTION INTRAMUSCULAR; INTRAVENOUS at 19:17

## 2023-02-08 RX ADMIN — MAGNESIUM SULFATE HEPTAHYDRATE 2 G: 40 INJECTION, SOLUTION INTRAVENOUS at 19:40

## 2023-02-08 RX ADMIN — OLANZAPINE 5 MG: 10 INJECTION, POWDER, FOR SOLUTION INTRAMUSCULAR at 21:18

## 2023-02-08 RX ADMIN — EPINEPHRINE 0.4 MG: 1 INJECTION, SOLUTION, CONCENTRATE INTRAVENOUS at 19:12

## 2023-02-08 RX ADMIN — METOCLOPRAMIDE HYDROCHLORIDE 10 MG: 5 INJECTION INTRAMUSCULAR; INTRAVENOUS at 19:39

## 2023-02-08 ASSESSMENT — ENCOUNTER SYMPTOMS
HEADACHES: 1
NECK PAIN: 1
COUGH: 1

## 2023-02-08 ASSESSMENT — ACTIVITIES OF DAILY LIVING (ADL)
ADLS_ACUITY_SCORE: 35
ADLS_ACUITY_SCORE: 35

## 2023-02-08 NOTE — ED PROVIDER NOTES
History     Chief Complaint:  Headache       HPI   Dot Lujan is a 48 year old female with a history of hypertension and migraines who presents with headache. Dot states that around 0200 this morning she woke up with a headache that started as a shooting pain in the back of her head spreading forward. This was also associated some left eye vision changes, intermittent aura in the left eye with colored lights, nausea, and bilateral hand and feet paresthesia. During evaluation, Dot also complains of some photophobia and phonophobia. She also notes that her pain has gradually worsened since onset and is now rated at a severity of 8/10. Her pain is mainly on the left side of her head and feels similar to her previous migraines. Dot also notes some left sided weakness, but states that this is chronic since a possible stroke. She otherwise denies any fever, sore throat, cough, shortness of breath, chest pain, emesis, birth control use, or recent trauma. Of note, Dot also has some facial twitching that began prior to her headache that has since continued as well as some diarrhea, which she's had issues with before. Dot does live her foster mother and some roommates and none of them have similar symptoms.     Independent Historian:   None - Patient Only    Review of External Notes: ED Note from 10/15/22     ROS:  Review of Systems   Constitutional: Negative for fever.   HENT: Negative for sore throat.    Eyes: Positive for photophobia and visual disturbance (aura).   Respiratory: Negative for cough and shortness of breath.    Cardiovascular: Negative for chest pain.   Gastrointestinal: Positive for diarrhea and nausea. Negative for vomiting.   Neurological: Positive for weakness (left, chronic) and headaches.        (+) Paresthesias  (+) Twitching   All other systems reviewed and are negative.      Allergies:  Codeine  Prochlorperazine  Droperidol  Phenothiazines     Medications:     Atorvastatin  Gabapentin  Trazodone  Fluoxetine  Dicyclomine  Albuterol  Ferosul  Topiramate   Botox  Zofran    Past Medical History:    Depression  AG  Asthma  Hypertension   Hyperlipidemia   Opioid use  Migraine  CVA with residual left sided weakness  Fibromyalgia  GERD  Tobacco use  IBS  Methamphetamine use    Past Surgical History:    Appendectomy   section   Ovarian cyst removal    Family History:    Stroke - father  Alzheimer's disease - mother    Social History:  Patient is unaccompanied in the ED.  Patient has history of tobacco use.  Patient has history of methamphetamine and opioid abuse.  Patient lives with foster mother and roommates.   PCP: Satish Hill     Physical Exam     Patient Vitals for the past 24 hrs:   BP Temp Pulse Resp SpO2   23 2217 109/64 -- 61 -- 95 %   23 2202 107/70 -- 67 -- 97 %   23 2147 111/67 -- 61 -- 96 %   23 2117 95/71 -- 60 -- 98 %   23 2102 112/64 -- 62 -- 98 %   23 2047 99/78 -- 59 -- 97 %   23 2032 118/63 -- 57 -- 97 %   23 1714 128/83 98.1  F (36.7  C) 71 18 98 %        Physical Exam  Vitals and nursing note reviewed.   Constitutional:       General: She is in acute distress (mild painful distress, sitting in darkened room with sunglasses and headphones on).      Appearance: She is not ill-appearing or toxic-appearing.   HENT:      Head: Normocephalic and atraumatic.      Right Ear: External ear normal.      Left Ear: External ear normal.      Nose: Nose normal.      Mouth/Throat:      Mouth: Mucous membranes are moist.   Eyes:      General: No visual field deficit.     Extraocular Movements: Extraocular movements intact.      Conjunctiva/sclera: Conjunctivae normal.      Pupils: Pupils are equal, round, and reactive to light.   Cardiovascular:      Rate and Rhythm: Normal rate and regular rhythm.      Heart sounds: No murmur heard.  Pulmonary:      Effort: Pulmonary effort is normal. No respiratory distress.       Breath sounds: Normal breath sounds. No wheezing, rhonchi or rales.   Abdominal:      General: Abdomen is flat. Bowel sounds are normal. There is no distension.      Palpations: Abdomen is soft.      Tenderness: There is no abdominal tenderness. There is no guarding or rebound.   Musculoskeletal:         General: No deformity or signs of injury.      Cervical back: Normal range of motion and neck supple. No rigidity.   Skin:     General: Skin is warm and dry.      Findings: No rash.   Neurological:      Mental Status: She is alert and oriented to person, place, and time.      GCS: GCS eye subscore is 4. GCS verbal subscore is 5. GCS motor subscore is 6.      Cranial Nerves: No cranial nerve deficit, dysarthria or facial asymmetry.      Sensory: Sensation is intact.      Motor: Weakness (mild LUE/LLE weakness which is chronic for patient) present.   Psychiatric:         Behavior: Behavior normal.           Emergency Department Course     Laboratory:  Labs Ordered and Resulted from Time of ED Arrival to Time of ED Departure - No data to display     Procedures   none    Emergency Department Course & Assessments:       Interventions:  Medications   0.9% sodium chloride BOLUS (0 mLs Intravenous Stopped 2/7/23 2218)   metoclopramide (REGLAN) injection 10 mg (10 mg Intravenous Given 2/7/23 1927)   diphenhydrAMINE (BENADRYL) injection 12.5 mg (12.5 mg Intravenous Given 2/7/23 1927)   ketorolac (TORADOL) injection 15 mg (15 mg Intravenous Given 2/7/23 1928)   dexamethasone PF (DECADRON) injection 10 mg (10 mg Intravenous Given 2/7/23 2114)   magnesium sulfate 2 g in water intermittent infusion (0 g Intravenous Stopped 2/7/23 2218)   HYDROmorphone (PF) (DILAUDID) injection 0.5 mg (0.5 mg Intravenous Given 2/7/23 2115)        Independent Interpretation (X-rays, CTs, rhythm strip):  none     Consultations/Discussion of Management or Tests:  none        Social Determinants of Health affecting care:   None    Assessments:  1906 I  obtained history and examined the patient as noted above.   I rechecked and updated the patient.    I rechecked the patient. She said she was feeling better. At this time, the patient was deemed safe to return home, and she agreed to the plan of care.    Disposition:  The patient was discharged to home.     Impression & Plan    CMS Diagnoses: None    Medical Decision Makin-year-old female presenting with a gradually worsening headache since this morning.  Suspect primary headache syndrome such as migraine given her history of similar headaches, associated visual aura, photophobia and nausea.  Low suspicion for subarachnoid given this was not a thunderclap onset.  She has no new neurologic deficits of low suspicion for stroke or intracranial hemorrhage.  No fever or meningeal signs to suggest a CNS infection.  No increased clotting risk to suggest a cerebral venous thrombosis.  No sick contacts to suggest toxic exposures such as carbon monoxide.  We will plan to treat with Reglan, Benadryl, Toradol, and IV fluids and reassess.     rechecked patient.  Her headache is mildly improved.  We will add additional medications and reassess.     rechecked patient another time.  Patient notes significant improvement of headache this time.  We will plan to discharge and have her follow-up with her primary care doctor this week.  Discussed return precautions.      Diagnosis:    ICD-10-CM    1. Acute nonintractable headache, unspecified headache type  R51.9            Discharge Medications:  Discharge Medication List as of 2023 10:21 PM           Scribe Disclosure:  I, Thuy Stout, am serving as a scribe at 7:22 PM on 2023 to document services personally performed by Lencho Greco MD based on my observations and the provider's statements to me.    2023   Lencho Greco MD Goodwin, Shaun M, MD  [phone]

## 2023-02-09 LAB
ATRIAL RATE - MUSE: 87 BPM
DIASTOLIC BLOOD PRESSURE - MUSE: NORMAL MMHG
INTERPRETATION ECG - MUSE: NORMAL
P AXIS - MUSE: 56 DEGREES
PR INTERVAL - MUSE: 138 MS
QRS DURATION - MUSE: 82 MS
QT - MUSE: 356 MS
QTC - MUSE: 428 MS
R AXIS - MUSE: 47 DEGREES
SYSTOLIC BLOOD PRESSURE - MUSE: NORMAL MMHG
T AXIS - MUSE: 27 DEGREES
VENTRICULAR RATE- MUSE: 87 BPM

## 2023-02-09 NOTE — ED PROVIDER NOTES
History     Chief Complaint:  Headache     The history is provided by the patient.      Dot Lujan is a 48 year old female with a history of chronic migraine, hypertension, hyperlipidemia, CVA, polysubstance abuse, tobacco dependence who presents with a headache for the past two days. Per chart review, she was seen here in the ED yesterday for this headache. She was treated with medications for migraine treatment with improvement of her headache. However, she states that her headache has returned and is now worse than it was yesterday, prompting her return to the ED. She reports that she has a history of migraine headaches which typically radiate from behind her eyes to her occipital scalp. The headache she has today is different from her typical migraines as it originates around her left frontal/parietal scalp and radiates to her neck and occipital scalp. She also feels as though her left frontal scalp is swollen. She endorses some neck pain. She denies recent illness. She does note that she has a cough at baseline due to tobacco use. Her cough is occasionally productive of brown or clear sputum. Denies use of drugs or regular use of alcohol. Denies history of surgeries to her head or neck. Endorses a history of asthma.    Today, the patient received a dose of Zofran while in the waiting room. Shortly after this, she returned to the triage desk, complained of difficulty breathing, and subsequently collapsed to the ground. She was given a dose of epinephrine and immediately roomed in the ED. Presently, she states that her throat continues to feel tight, though it has improved.    Independent Historian:  None - Patient Only    Review of External Notes:   Reviewed multiple ED notes from various emergency departments for chronic headache including emergency department visit on 2/7/2023, 10/15/2022, 8/8/2022 and 7/21/2022    ROS:  Review of Systems   Respiratory: Positive for cough (baseline).     Musculoskeletal: Positive for neck pain.   Neurological: Positive for headaches.   All other systems reviewed and are negative.    Allergies:  Prochlorperazine  Droperidol  Codeine   Phenothiazines  Bee venom    Medications:    Lipitor  Bentyl  Lisinopril  Magnesium  Trazodone  Albuterol inhaler  Gabapentin  Prozac  Topamax  Flonase    Past Medical History:    Allergic rhinitis  Anxiety  Asthma  Occipital neuralgia, bilateral  GERD  Chronic migraine  Fibromyalgia  IBS  Hypertension  Hyperlipidemia   Methamphetamine use disorder  Opioid use disorder  Depression  Tobacco dependence  Trauma and stressor-related disorder  CVA  Suicide attempt    Past Surgical History:    Appendectomy    section   Ovarian cyst removal    Family History:    Father: stroke  Mother: Alzheimer's disease    Social History:  The patient presents to the ED alone.  The patient presents to the ED via private car.  The patient is a smoker.  Denies drug use or regular use of alcohol.   PCP: Satish Hill     Physical Exam     Patient Vitals for the past 24 hrs:   BP Temp Temp src Pulse Resp SpO2   23 2242 116/58 -- -- -- -- 96 %   23 2227 115/68 -- -- -- -- 96 %   23 2212 132/69 -- -- -- -- 97 %   23 2201 125/70 -- -- 69 -- 99 %   23 2146 113/75 -- -- 73 -- 96 %   23 1923 128/79 -- -- 93 -- 99 %   23 191 -- -- -- -- -- 97 %   23 191 (!) 150/80 -- -- 91 -- --   23 1820 -- 99.3  F (37.4  C) Temporal -- -- --   23 181 (!) 140/78 -- -- -- -- --   23 -- -- -- 96 18 98 %      Physical Exam  General: Patient is awake, alert and interactive   Head: No signs of trauma.   Eyes: Wearing sunglasses the pupils are equal, round, and reactive to light. Conjunctivae and sclerae are normal. No nystagmus. Full ROM of both eyes and appears symmetric without obvious palsy.   ENT: TMs are clear bilaterally without effusion.  Wearing headphones  Neck: Normal range of motion.   CV:  Regular rate. S1/S2. No murmurs.   Resp: Lungs are clear without wheezes or rales. No respiratory distress.   GI: Abdomen is soft, no rigidity, guarding, or rebound. No distension. No tenderness to palpation in any quadrant.     MS: Normal tone. Joints grossly normal without effusions. No asymmetric leg swelling, calf or thigh tenderness.    Skin: No rash or lesions noted. Normal capillary refill noted  Neuro:   Speech is normal and fluent.   Face is symmetric without droop. CN's II-XII intact. Negative pronator drift. Finger to nose intact. Heel to shin intact.   Right Arm: Good  strength. 5/5 elbow flexion. 5/5 elbow extension. Sensation intact to light touch.   Left Arm: Good  strength. 5/5 elbow flexion. 5/5 elbow extension. Sensation intact to light touch.   Right Le/5 straight leg raise, 5/5 knee flexion, 5/5 knee extension, 5/5 dorsiflexion, 5/5 plantar flexion. Sensation intact to light touch.   Left Le/5 straight leg raise, 5/5 knee flexion, 5/5 knee extension, 5/5 dorsiflexion, 5/5 plantar flexion. Sensation intact to light touch.    Psych: Quite anxious    Emergency Department Course     ECG  ECG taken at 1923, ECG read at 192  Normal sinus rhythm  Normal ECG  Rate 87 bpm. KS interval 138 ms. QRS duration 82 ms. QT/QTc 356/428 ms. P-R-T axes 56 47 27.     Imaging:  XR Chest 2 Views   Final Result   IMPRESSION: Negative chest.      CT Head w/o Contrast   Final Result   IMPRESSION:   1.  No acute intracranial process.      Report per radiology    Laboratory:  Labs Ordered and Resulted from Time of ED Arrival to Time of ED Departure   BASIC METABOLIC PANEL - Abnormal       Result Value    Sodium 139      Potassium 3.4      Chloride 106      Carbon Dioxide (CO2) 17 (*)     Anion Gap 16 (*)     Urea Nitrogen 9.9      Creatinine 0.69      Calcium 9.0      Glucose 138 (*)     GFR Estimate >90     CBC WITH PLATELETS AND DIFFERENTIAL - Abnormal    WBC Count 22.4 (*)     RBC Count 4.94       Hemoglobin 14.4      Hematocrit 43.1      MCV 87      MCH 29.1      MCHC 33.4      RDW 14.7      Platelet Count 345      % Neutrophils 78      % Lymphocytes 14      % Monocytes 7      % Eosinophils 0      % Basophils 0      % Immature Granulocytes 1      NRBCs per 100 WBC 0      Absolute Neutrophils 17.6 (*)     Absolute Lymphocytes 3.1      Absolute Monocytes 1.6 (*)     Absolute Eosinophils 0.0      Absolute Basophils 0.0      Absolute Immature Granulocytes 0.1      Absolute NRBCs 0.0     TROPONIN T, HIGH SENSITIVITY - Normal    Troponin T, High Sensitivity <6        Emergency Department Course & Assessments:       Interventions:  Medications   ketorolac (TORADOL) injection 15 mg (has no administration in time range)   ondansetron (ZOFRAN ODT) ODT tab 4 mg (4 mg Oral Given 2/8/23 1822)   EPINEPHrine (ADRENALIN) kit 0.4 mg (0.4 mg Subcutaneous Given 2/8/23 1912)   diphenhydrAMINE (BENADRYL) injection 25 mg (25 mg Intravenous Given 2/8/23 1917)   metoclopramide (REGLAN) injection 10 mg (10 mg Intravenous Given 2/8/23 1939)   0.9% sodium chloride BOLUS (0 mLs Intravenous Stopped 2/8/23 2305)   magnesium sulfate 2 g in water intermittent infusion (0 g Intravenous Stopped 2/8/23 2305)   OLANZapine (zyPREXA) IV injection 5 mg (5 mg Intravenous Given 2/8/23 2118)      Assessments:  1816 I obtained history and performed an examination of the patient. The patient was immediately roomed after collapsing in triage. She had been complaining of difficulty breathing after receiving a dose of Zofran in the lobby. She collapsed a short time later and was given a dose of epinephrine.  2107 The patient was rechecked and updated. We discussed results and plan of care.    Disposition:  The patient was discharged to home.     Impression & Plan      Medical Decision Making:  Dot Lujan is a 48 year old female chronic pain and chronic intractable migraines who presents to the emergency department with ongoing headache.   Patient was initially seen in triage where she was given some Zofran for her symptoms.  She then had a reported reaction to Zofran where she had some difficulty breathing and was treated with epinephrine and Benadryl.  She was brought back to room 11 for further evaluation.  I do not appreciate any tongue or oral swelling on my initial exam.  No evidence of urticaria.  Questionable whether this represents a true anaphylactic reaction.  However she was monitored in the emergency department for several hours without recurrence of allergic type symptoms.  Patient's initial complaint was intractable headache which has been a chronic issue for her.  This has been well documented throughout many emergency department visits.  She was treated initially with IV fluids, Toradol, Reglan and Benadryl with only minimal improvement of her symptoms.  Discussed alternative options and ultimately went ahead with IV Zyprexa.  Headache improved after Zyprexa but had not resolved yet.  I discussed with her that despite our aggressive therapies it is unlikely that we will be able to completely resolve her headache.  She has follow-up appointments with neurology for repeat Botox injections.  I recommended that she continue to keep these appointments.  CT of her head today did not show any signs of intracranial hemorrhage or mass.  The remainder of her neurologic examination was normal without any deficits to raise suspicion of additional neurological pathology.  I do not believe that she required further neurological imaging at this juncture.    Diagnosis:    ICD-10-CM    1. Chronic intractable headache, unspecified headache type  R51.9     G89.29          Scribe Disclosure:  WENDY, Tatiana Owens, am serving as a scribe at 8:21 PM on 2/8/2023 to document services personally performed by Charles Pham MD based on my observations and the provider's statements to me.     2/8/2023   Charles Pham MD Battista,  Charles Prescott MD  02/09/23 0122

## 2023-02-09 NOTE — ED NOTES
"1910: Pt was ambulating independently to the registration desk when writer noticed pt lower herself to the floor. Writer responded. Radial pulse regular. Pt observed to be holding her breath. Writer encouraged pt to breathe, pt not taking spontaneous breaths independently, but awake and making eye contact. Pt then began holding throat while still holding breath. Transferred to FT1, Ozygen 96% on RA, HR 70s. Dr Carver responded. Writer encouraged pt to talk, pt still not verbally responding, restless in bed, GCS 14. Pt observed to still be holding her breathe, writer encouraged pt to take a breath several times without success. Oxygen on room air and stable 96-98% during this.     .4 IM epi and 25mg IV benadryl given. 30-45 seconds post administration, pt stated \"I can breathe!\" Pt then began spontaneously breathing on own without difficulty. Pt's vitals remained stable. No obvious oral swelling noted prior to medication administration.    Pt states she believes this is an allergic reaction to ODT Zofran given at 1822  "

## 2023-02-09 NOTE — ED TRIAGE NOTES
PT presents to ED with headache. States she was seen for the same yesterday, headache was 6/10 after discharge. Pain is back up to 8/10. Taking ibuprofen. Last took at 4 pm. Recently started on botox for this and had her first treatment, was told she needed 3 treatments to see if it is helpful or not.      Triage Assessment     Row Name 02/08/23 3868       Triage Assessment (Adult)    Airway WDL WDL

## 2024-02-15 ENCOUNTER — HOSPITAL ENCOUNTER (EMERGENCY)
Facility: CLINIC | Age: 49
Discharge: HOME | End: 2024-02-15
Attending: EMERGENCY MEDICINE | Admitting: EMERGENCY MEDICINE
Payer: COMMERCIAL

## 2024-02-15 VITALS
DIASTOLIC BLOOD PRESSURE: 75 MMHG | BODY MASS INDEX: 25.69 KG/M2 | RESPIRATION RATE: 20 BRPM | HEIGHT: 63 IN | OXYGEN SATURATION: 96 % | WEIGHT: 145 LBS | SYSTOLIC BLOOD PRESSURE: 108 MMHG | HEART RATE: 62 BPM | TEMPERATURE: 98.2 F

## 2024-02-15 DIAGNOSIS — G43.809 OTHER MIGRAINE WITHOUT STATUS MIGRAINOSUS, NOT INTRACTABLE: ICD-10-CM

## 2024-02-15 PROCEDURE — 250N000011 HC RX IP 250 OP 636: Performed by: EMERGENCY MEDICINE

## 2024-02-15 PROCEDURE — 96375 TX/PRO/DX INJ NEW DRUG ADDON: CPT

## 2024-02-15 PROCEDURE — 99284 EMERGENCY DEPT VISIT MOD MDM: CPT | Mod: 25

## 2024-02-15 PROCEDURE — 250N000012 HC RX MED GY IP 250 OP 636 PS 637: Mod: JZ | Performed by: EMERGENCY MEDICINE

## 2024-02-15 PROCEDURE — 250N000013 HC RX MED GY IP 250 OP 250 PS 637: Performed by: EMERGENCY MEDICINE

## 2024-02-15 PROCEDURE — 96372 THER/PROPH/DIAG INJ SC/IM: CPT | Mod: XS | Performed by: EMERGENCY MEDICINE

## 2024-02-15 PROCEDURE — 96365 THER/PROPH/DIAG IV INF INIT: CPT

## 2024-02-15 PROCEDURE — 96361 HYDRATE IV INFUSION ADD-ON: CPT

## 2024-02-15 PROCEDURE — 258N000003 HC RX IP 258 OP 636: Performed by: EMERGENCY MEDICINE

## 2024-02-15 RX ORDER — DIPHENHYDRAMINE HYDROCHLORIDE 50 MG/ML
25 INJECTION INTRAMUSCULAR; INTRAVENOUS ONCE
Status: COMPLETED | OUTPATIENT
Start: 2024-02-15 | End: 2024-02-15

## 2024-02-15 RX ORDER — SUMATRIPTAN 50 MG/1
50 TABLET, FILM COATED ORAL
Qty: 10 TABLET | Refills: 0 | Status: SHIPPED | OUTPATIENT
Start: 2024-02-15 | End: 2024-09-10

## 2024-02-15 RX ORDER — OLANZAPINE 5 MG/1
5 TABLET, ORALLY DISINTEGRATING ORAL ONCE
Status: COMPLETED | OUTPATIENT
Start: 2024-02-15 | End: 2024-02-15

## 2024-02-15 RX ORDER — MAGNESIUM SULFATE 1 G/100ML
1 INJECTION INTRAVENOUS ONCE
Status: COMPLETED | OUTPATIENT
Start: 2024-02-15 | End: 2024-02-15

## 2024-02-15 RX ORDER — OLANZAPINE 5 MG/1
5 TABLET, ORALLY DISINTEGRATING ORAL EVERY 8 HOURS PRN
Qty: 6 TABLET | Refills: 0 | Status: SHIPPED | OUTPATIENT
Start: 2024-02-15

## 2024-02-15 RX ORDER — METOCLOPRAMIDE HYDROCHLORIDE 5 MG/ML
10 INJECTION INTRAMUSCULAR; INTRAVENOUS ONCE
Status: COMPLETED | OUTPATIENT
Start: 2024-02-15 | End: 2024-02-15

## 2024-02-15 RX ORDER — KETOROLAC TROMETHAMINE 15 MG/ML
15 INJECTION, SOLUTION INTRAMUSCULAR; INTRAVENOUS ONCE
Status: COMPLETED | OUTPATIENT
Start: 2024-02-15 | End: 2024-02-15

## 2024-02-15 RX ORDER — SUMATRIPTAN 6 MG/.5ML
6 INJECTION, SOLUTION SUBCUTANEOUS ONCE
Status: COMPLETED | OUTPATIENT
Start: 2024-02-15 | End: 2024-02-15

## 2024-02-15 RX ORDER — DEXAMETHASONE SODIUM PHOSPHATE 10 MG/ML
10 INJECTION, SOLUTION INTRAMUSCULAR; INTRAVENOUS ONCE
Status: COMPLETED | OUTPATIENT
Start: 2024-02-15 | End: 2024-02-15

## 2024-02-15 RX ADMIN — SUMATRIPTAN 6 MG: 6 INJECTION, SOLUTION SUBCUTANEOUS at 11:56

## 2024-02-15 RX ADMIN — SODIUM CHLORIDE 1000 ML: 9 INJECTION, SOLUTION INTRAVENOUS at 10:31

## 2024-02-15 RX ADMIN — DEXAMETHASONE SODIUM PHOSPHATE 10 MG: 10 INJECTION INTRAMUSCULAR; INTRAVENOUS at 10:33

## 2024-02-15 RX ADMIN — OLANZAPINE 5 MG: 5 TABLET, ORALLY DISINTEGRATING ORAL at 11:56

## 2024-02-15 RX ADMIN — METOCLOPRAMIDE HYDROCHLORIDE 10 MG: 5 INJECTION INTRAMUSCULAR; INTRAVENOUS at 10:33

## 2024-02-15 RX ADMIN — MAGNESIUM SULFATE HEPTAHYDRATE 1 G: 1 INJECTION, SOLUTION INTRAVENOUS at 11:56

## 2024-02-15 RX ADMIN — KETOROLAC TROMETHAMINE 15 MG: 15 INJECTION, SOLUTION INTRAMUSCULAR; INTRAVENOUS at 10:33

## 2024-02-15 RX ADMIN — DIPHENHYDRAMINE HYDROCHLORIDE 25 MG: 50 INJECTION, SOLUTION INTRAMUSCULAR; INTRAVENOUS at 10:31

## 2024-02-15 ASSESSMENT — ACTIVITIES OF DAILY LIVING (ADL): ADLS_ACUITY_SCORE: 35

## 2024-02-15 NOTE — ED PROVIDER NOTES
"  History     Chief Complaint:  Headache       HPI   Dot Lujan is a 49 year old female with a past medical history of HTN, hyperlipidemia, CVA, and fibromyalgia who presents with a headache. The patient states that o Monday she developed a headache and that the headache has worsened and has been constant since then. She has been taking gabapentin and Advil for the headache. She states that yesterday she began to vomit due to the headache. She states that this feels like her typical migraine.      Independent Historian:   None - Patient Only    Review of External Notes:         Medications:    Lipitor  Bentyl  Lisinopril  Magnesium  Trazodone  Albuterol inhaler  Gabapentin  Prozac  Topamax  Flonase     Past Medical History:    Allergic rhinitis  Anxiety  Asthma  Occipital neuralgia, bilateral  GERD  Chronic migraine  Fibromyalgia  IBS  Hypertension  Hyperlipidemia   Methamphetamine use disorder  Opioid use disorder  Depression  Tobacco dependence  Trauma and stressor-related disorder  CVA  Suicide attempt     Past Surgical History:    Appendectomy    section   Ovarian cyst removal    Physical Exam   Patient Vitals for the past 24 hrs:   BP Temp Temp src Pulse Resp SpO2 Height Weight   02/15/24 1215 111/65 -- -- 55 -- 98 % -- --   02/15/24 1200 102/60 -- -- 55 -- 99 % -- --   02/15/24 1150 -- -- -- 62 -- 99 % -- --   02/15/24 1022 -- -- -- -- -- 98 % -- --   02/15/24 1021 -- -- -- -- -- 98 % -- --   02/15/24 1020 101/56 -- -- 85 -- -- -- --   02/15/24 0934 110/65 98.2  F (36.8  C) Oral 91 20 98 % 1.6 m (5' 3\") 65.8 kg (145 lb)        Physical Exam  Vitals and nursing note reviewed.   Constitutional:       Comments: Odd appearing wearing sunglasses a jacket over her head earphones on.   HENT:      Nose: Nose normal.   Eyes:      Pupils: Pupils are equal, round, and reactive to light.   Cardiovascular:      Rate and Rhythm: Normal rate.   Pulmonary:      Effort: Pulmonary effort is normal. "   Skin:     General: Skin is warm.      Capillary Refill: Capillary refill takes less than 2 seconds.   Neurological:      General: No focal deficit present.      Mental Status: She is alert and oriented to person, place, and time.   Psychiatric:         Mood and Affect: Mood normal.           Emergency Department Course     Emergency Department Course & Assessments:    Interventions:  Medications   sodium chloride 0.9% BOLUS 1,000 mL (0 mLs Intravenous Stopped 2/15/24 1152)   dexAMETHasone PF (DECADRON) injection 10 mg (10 mg Intravenous $Given 2/15/24 1033)   ketorolac (TORADOL) injection 15 mg (15 mg Intravenous $Given 2/15/24 1033)   metoclopramide (REGLAN) injection 10 mg (10 mg Intravenous $Given 2/15/24 1033)   diphenhydrAMINE (BENADRYL) injection 25 mg (25 mg Intravenous $Given 2/15/24 1031)   magnesium sulfate 1 g in 100 mL D5W intermittent infusion (0 g Intravenous Stopped 2/15/24 1232)   SUMAtriptan (IMITREX) injection 6 mg (6 mg Subcutaneous $Given 2/15/24 1156)   OLANZapine zydis (zyPREXA) ODT tab 5 mg (5 mg Oral $Given 2/15/24 1156)      Assessments:  1005 Obtained the patients history and performed initial exam  1133 Rechecked the patient  1245 Rechecked the patient, she is feeling better    Social Determinants of Health affecting care:   None    Disposition:  The patient was discharged.     Impression & Plan      Medical Decision Making:  Patient presents with exacerbation of migraine.  Patient was offered migraine medications without relief second round including Zyprexa and Imitrex seemed to help.  Patient has a history of opiate dependence and has been given Dilaudid in the past patient was explained explicitly that narcotics are not recommended by the [employer] to treat migraines.  Patient was offered reassurance as needed meds for at home and discharged home in stable condition.  No clinical concern for aneurysm meningitis CVT or other intracranial emergency      Diagnosis:     ICD-10-CM    1. Other migraine without status migrainosus, not intractable  G43.809            Discharge Medications:  New Prescriptions    OLANZAPINE ZYDIS (ZYPREXA) 5 MG ODT    Take 1 tablet (5 mg) by mouth every 8 hours as needed for other (migraine headache)    SUMATRIPTAN (IMITREX) 50 MG TABLET    Take 1 tablet (50 mg) by mouth at onset of headache for migraine May repeat in 2 hours. Max 4 tablets/24 hours.        Scribe Disclosure:  Reese NGUYEN, am serving as a scribe at 10:10 AM on 2/15/2024 to document services personally performed by Butch Ryan MD based on my observations and the provider's statements to me.     2/15/2024   Butch Ryan MD Goodman, Brian Samuel, MD  02/15/24 7979

## 2024-02-15 NOTE — ED TRIAGE NOTES
C/o headache that started on Monday. Pt has a hx of migraines and states this feels like her typical migraine. Pt has photophobia, and intermittent nausea. Vomiting since yesterday when moving. Has been taking tylenol and gabapentin for the headache without relief.      Triage Assessment (Adult)       Row Name 02/15/24 0936          Triage Assessment    Airway WDL WDL        Respiratory WDL    Respiratory WDL WDL        Skin Circulation/Temperature WDL    Skin Circulation/Temperature WDL WDL        Cardiac WDL    Cardiac WDL WDL        Peripheral/Neurovascular WDL    Peripheral Neurovascular WDL WDL        Cognitive/Neuro/Behavioral WDL    Cognitive/Neuro/Behavioral WDL --  headache

## 2024-02-15 NOTE — DISCHARGE INSTRUCTIONS
Are first-line medications given were Toradol steroid and Reglan.  Our second line medications were Zyprexa and magnesiumand.as well as Imitrex  Please follow-up with your regular doctor to discuss long-term management of headaches

## 2024-06-21 ENCOUNTER — APPOINTMENT (OUTPATIENT)
Dept: CT IMAGING | Facility: CLINIC | Age: 49
End: 2024-06-21
Attending: EMERGENCY MEDICINE
Payer: COMMERCIAL

## 2024-06-21 ENCOUNTER — HOSPITAL ENCOUNTER (EMERGENCY)
Facility: CLINIC | Age: 49
Discharge: HOME | End: 2024-06-21
Attending: EMERGENCY MEDICINE | Admitting: EMERGENCY MEDICINE
Payer: COMMERCIAL

## 2024-06-21 VITALS
OXYGEN SATURATION: 94 % | TEMPERATURE: 98 F | BODY MASS INDEX: 27.6 KG/M2 | SYSTOLIC BLOOD PRESSURE: 106 MMHG | HEART RATE: 83 BPM | DIASTOLIC BLOOD PRESSURE: 96 MMHG | HEIGHT: 62 IN | RESPIRATION RATE: 18 BRPM | WEIGHT: 150 LBS

## 2024-06-21 DIAGNOSIS — M62.830 BACK MUSCLE SPASM: ICD-10-CM

## 2024-06-21 LAB — HCG SER QL IA.RAPID: NEGATIVE

## 2024-06-21 PROCEDURE — 99285 EMERGENCY DEPT VISIT HI MDM: CPT | Mod: 25

## 2024-06-21 PROCEDURE — 72131 CT LUMBAR SPINE W/O DYE: CPT

## 2024-06-21 PROCEDURE — 96375 TX/PRO/DX INJ NEW DRUG ADDON: CPT

## 2024-06-21 PROCEDURE — 96374 THER/PROPH/DIAG INJ IV PUSH: CPT

## 2024-06-21 PROCEDURE — 84703 CHORIONIC GONADOTROPIN ASSAY: CPT

## 2024-06-21 PROCEDURE — 250N000013 HC RX MED GY IP 250 OP 250 PS 637: Performed by: EMERGENCY MEDICINE

## 2024-06-21 PROCEDURE — 250N000011 HC RX IP 250 OP 636: Mod: JZ | Performed by: EMERGENCY MEDICINE

## 2024-06-21 RX ORDER — DEXAMETHASONE SODIUM PHOSPHATE 10 MG/ML
10 INJECTION, SOLUTION INTRAMUSCULAR; INTRAVENOUS ONCE
Status: COMPLETED | OUTPATIENT
Start: 2024-06-21 | End: 2024-06-21

## 2024-06-21 RX ORDER — METHOCARBAMOL 500 MG/1
500 TABLET, FILM COATED ORAL 4 TIMES DAILY PRN
Qty: 15 TABLET | Refills: 0 | Status: SHIPPED | OUTPATIENT
Start: 2024-06-21

## 2024-06-21 RX ORDER — ACETAMINOPHEN 500 MG
1000 TABLET ORAL ONCE
Status: COMPLETED | OUTPATIENT
Start: 2024-06-21 | End: 2024-06-21

## 2024-06-21 RX ORDER — KETOROLAC TROMETHAMINE 15 MG/ML
10 INJECTION, SOLUTION INTRAMUSCULAR; INTRAVENOUS ONCE
Status: COMPLETED | OUTPATIENT
Start: 2024-06-21 | End: 2024-06-21

## 2024-06-21 RX ORDER — GABAPENTIN 100 MG/1
100 CAPSULE ORAL 3 TIMES DAILY
COMMUNITY

## 2024-06-21 RX ORDER — DIAZEPAM 10 MG/2ML
2.5 INJECTION, SOLUTION INTRAMUSCULAR; INTRAVENOUS ONCE
Status: COMPLETED | OUTPATIENT
Start: 2024-06-21 | End: 2024-06-21

## 2024-06-21 RX ADMIN — ACETAMINOPHEN 1000 MG: 500 TABLET, FILM COATED ORAL at 03:29

## 2024-06-21 RX ADMIN — DIAZEPAM 2.5 MG: 5 INJECTION, SOLUTION INTRAMUSCULAR; INTRAVENOUS at 03:37

## 2024-06-21 RX ADMIN — DEXAMETHASONE SODIUM PHOSPHATE 10 MG: 10 INJECTION, SOLUTION INTRAMUSCULAR; INTRAVENOUS at 03:40

## 2024-06-21 RX ADMIN — KETOROLAC TROMETHAMINE 10 MG: 15 INJECTION, SOLUTION INTRAMUSCULAR; INTRAVENOUS at 03:39

## 2024-06-21 ASSESSMENT — ACTIVITIES OF DAILY LIVING (ADL)
ADLS_ACUITY_SCORE: 35

## 2024-06-21 ASSESSMENT — COLUMBIA-SUICIDE SEVERITY RATING SCALE - C-SSRS
6. HAVE YOU EVER DONE ANYTHING, STARTED TO DO ANYTHING, OR PREPARED TO DO ANYTHING TO END YOUR LIFE?: NO
2. HAVE YOU ACTUALLY HAD ANY THOUGHTS OF KILLING YOURSELF IN THE PAST MONTH?: NO
1. IN THE PAST MONTH, HAVE YOU WISHED YOU WERE DEAD OR WISHED YOU COULD GO TO SLEEP AND NOT WAKE UP?: NO

## 2024-06-21 NOTE — ED NOTES
"Rounded on pt, noted pt is calm and resting in bed and on her phone playing games when peak through the door. When writer went in assessing pain, pt start to shake and stated she is \"still doing pretty bad\", \"7/10 pain\" , MD made aware.   "

## 2024-06-21 NOTE — ED PROVIDER NOTES
"  Emergency Department Note      History of Present Illness     Chief Complaint  Back Pain    HPI  Dot Lujan is a 49 year old female with a history of anxiety, hypertension who presents with back pain. The patient states that her back \"got stuck\" and locked up with severe lower back pain while trying to roll over in bed this evening. She took Tylenol for the pain with little relief. She states the pain radiates down into her hips, mostly on the right side.  No extension to her legs.  No difficulty urinating or passing stool.  No fevers.    Independent Historian  None    Review of External Notes  I reviewed care everywhere and updated epic.     Past Medical History   Medical History and Problem List  Past Medical History:   Diagnosis Date    Allergic rhinitis     Anxiety     Asthma     Depressive disorder     Dyslipidemia     Fibromyalgia     Gastroesophageal reflux disease     Hypertension     Methamphetamine abuse     Migraine     Opioid abuse      Medications  atorvastatin (LIPITOR) 10 MG tablet  dicyclomine (BENTYL) 10 MG capsule  lisinopril (ZESTRIL) 10 MG tablet  magnesium 250 MG tablet  OLANZapine zydis (ZYPREXA) 5 MG ODT  SUMAtriptan (IMITREX) 50 MG tablet  traZODone (DESYREL) 100 MG tablet        Surgical History   No past surgical history on file.    Physical Exam   Patient Vitals for the past 24 hrs:   BP Temp Pulse Resp SpO2 Height Weight   06/21/24 0223 -- 98  F (36.7  C) -- -- -- -- --   06/21/24 0222 96/76 -- 82 18 98 % 1.575 m (5' 2\") 68 kg (150 lb)     Physical Exam  Nursing note and vitals reviewed.  Constitutional: Cooperative. Uncomfortable appearing.  HENT:   Mouth/Throat: Mucous membranes are normal.   Cardiovascular: Normal rate, regular rhythm and normal heart sounds.  No murmur.  Pulmonary/Chest: Effort normal and breath sounds normal. No respiratory distress.   Abdominal: Soft, nontender.  No distention.  Musculoskeletal: Normal range of motion of lower extremities. "   Neurological: Alert.  Strength and sensation normal in lower extremities  Skin: Skin is warm and dry. No rash noted on lower back.   Psychiatric: Normal mood and affect.     Diagnostics   Lab Results   Labs Ordered and Resulted from Time of ED Arrival to Time of ED Departure - No data to display    Imaging  CT Lumbar Spine w/o Contrast   Final Result   IMPRESSION:   1. Good anatomic alignment and vertebral body heights maintained.   2. No evidence of an acute lumbar spine fracture.   3. No significant canal compromise or neural foraminal narrowing throughout lumbar spine.        Independent Interpretation  None    ED Course    Medications Administered  Medications   ketorolac (TORADOL) injection 10 mg (10 mg Intravenous $Given 6/21/24 0339)   dexAMETHasone PF (DECADRON) injection 10 mg (10 mg Intravenous $Given 6/21/24 0340)   acetaminophen (TYLENOL) tablet 1,000 mg (1,000 mg Oral $Given 6/21/24 0329)   diazepam (VALIUM) injection 2.5 mg (2.5 mg Intravenous $Given 6/21/24 0337)     Discussion of Management  None    Social Determinants of Health adding to complexity of care  None    ED Course  ED Course as of 06/21/24 0257 Fri Jun 21, 2024   0250 I initially assessed the patient and obtained the above history and physical exam.       Medical Decision Making / Diagnosis     MDM  Dot Lujan is a 49 year old female who presents with atraumatic lumbar back spasm.  No findings concerning for cauda equina syndrome.  She is not anticoagulated.  No direct falls or trauma.  CT scan was reassuring.  Neurologic exam in the lower extremities is normal.  She has had mild improvement with the above interventions.  Plan of care will be supportive with topical anti-inflammatory gel as well as muscle relaxants with recommendations to continue ibuprofen and Tylenol as needed.    Disposition  The patient was discharged.     ICD-10 Codes:    ICD-10-CM    1. Back muscle spasm  M62.830            Discharge  Medications  New Prescriptions    DICLOFENAC (VOLTAREN) 1 % TOPICAL GEL    Apply 4 g topically 4 times daily    METHOCARBAMOL (ROBAXIN) 500 MG TABLET    Take 1 tablet (500 mg) by mouth 4 times daily as needed for muscle spasms     Scribe Disclosure:  I, Jm Wilson, am serving as a scribe at 2:16 AM on 6/21/2024 to document services personally performed by Cosme Robbins MD based on my observations and the provider's statements to me.        Cosme Robbins MD  06/21/24 0630

## 2024-06-21 NOTE — ED TRIAGE NOTES
BIBA from group home, rolled over in sleep and tweaked back. Patient has hx of herniated discs. Patient rates pain 8/10 starting in lower back going to mid back and shoulders. Tyelnol in the afternoon. VSS able to stand and pivot.     Triage Assessment (Adult)       Row Name 06/21/24 0218          Triage Assessment    Airway WDL WDL        Respiratory WDL    Respiratory WDL WDL        Skin Circulation/Temperature WDL    Skin Circulation/Temperature WDL WDL        Cardiac WDL    Cardiac WDL WDL     Cardiac Rhythm NSR        Peripheral/Neurovascular WDL    Peripheral Neurovascular WDL WDL        Cognitive/Neuro/Behavioral WDL    Cognitive/Neuro/Behavioral WDL WDL

## 2024-06-30 ENCOUNTER — APPOINTMENT (OUTPATIENT)
Dept: GENERAL RADIOLOGY | Facility: CLINIC | Age: 49
End: 2024-06-30
Attending: EMERGENCY MEDICINE
Payer: COMMERCIAL

## 2024-06-30 ENCOUNTER — HOSPITAL ENCOUNTER (EMERGENCY)
Facility: CLINIC | Age: 49
Discharge: HOME | End: 2024-06-30
Attending: EMERGENCY MEDICINE | Admitting: EMERGENCY MEDICINE
Payer: COMMERCIAL

## 2024-06-30 VITALS
RESPIRATION RATE: 17 BRPM | OXYGEN SATURATION: 97 % | TEMPERATURE: 97.4 F | SYSTOLIC BLOOD PRESSURE: 132 MMHG | WEIGHT: 150 LBS | BODY MASS INDEX: 27.44 KG/M2 | DIASTOLIC BLOOD PRESSURE: 85 MMHG | HEART RATE: 89 BPM

## 2024-06-30 DIAGNOSIS — J45.909 ASTHMA, UNSPECIFIED ASTHMA SEVERITY, UNSPECIFIED WHETHER COMPLICATED, UNSPECIFIED WHETHER PERSISTENT: ICD-10-CM

## 2024-06-30 DIAGNOSIS — R07.9 CHEST PAIN, UNSPECIFIED TYPE: ICD-10-CM

## 2024-06-30 DIAGNOSIS — J04.0 LARYNGITIS: ICD-10-CM

## 2024-06-30 DIAGNOSIS — J06.9 UPPER RESPIRATORY TRACT INFECTION, UNSPECIFIED TYPE: ICD-10-CM

## 2024-06-30 LAB
ANION GAP SERPL CALCULATED.3IONS-SCNC: 13 MMOL/L (ref 7–15)
BASOPHILS # BLD AUTO: 0 10E3/UL (ref 0–0.2)
BASOPHILS NFR BLD AUTO: 0 %
BUN SERPL-MCNC: 6 MG/DL (ref 6–20)
CALCIUM SERPL-MCNC: 8.7 MG/DL (ref 8.6–10)
CHLORIDE SERPL-SCNC: 107 MMOL/L (ref 98–107)
CREAT SERPL-MCNC: 0.67 MG/DL (ref 0.51–0.95)
DEPRECATED HCO3 PLAS-SCNC: 17 MMOL/L (ref 22–29)
EGFRCR SERPLBLD CKD-EPI 2021: >90 ML/MIN/1.73M2
EOSINOPHIL # BLD AUTO: 0.1 10E3/UL (ref 0–0.7)
EOSINOPHIL NFR BLD AUTO: 1 %
ERYTHROCYTE [DISTWIDTH] IN BLOOD BY AUTOMATED COUNT: 14.4 % (ref 10–15)
FLUAV RNA SPEC QL NAA+PROBE: NEGATIVE
FLUBV RNA RESP QL NAA+PROBE: NEGATIVE
GLUCOSE SERPL-MCNC: 101 MG/DL (ref 70–99)
HCT VFR BLD AUTO: 35.8 % (ref 35–47)
HGB BLD-MCNC: 11.6 G/DL (ref 11.7–15.7)
HOLD SPECIMEN: NORMAL
IMM GRANULOCYTES # BLD: 0 10E3/UL
IMM GRANULOCYTES NFR BLD: 1 %
LYMPHOCYTES # BLD AUTO: 1.5 10E3/UL (ref 0.8–5.3)
LYMPHOCYTES NFR BLD AUTO: 18 %
MCH RBC QN AUTO: 27.8 PG (ref 26.5–33)
MCHC RBC AUTO-ENTMCNC: 32.4 G/DL (ref 31.5–36.5)
MCV RBC AUTO: 86 FL (ref 78–100)
MONOCYTES # BLD AUTO: 0.7 10E3/UL (ref 0–1.3)
MONOCYTES NFR BLD AUTO: 8 %
NEUTROPHILS # BLD AUTO: 6 10E3/UL (ref 1.6–8.3)
NEUTROPHILS NFR BLD AUTO: 72 %
NRBC # BLD AUTO: 0 10E3/UL
NRBC BLD AUTO-RTO: 0 /100
PLATELET # BLD AUTO: 277 10E3/UL (ref 150–450)
POTASSIUM SERPL-SCNC: 3.7 MMOL/L (ref 3.4–5.3)
RBC # BLD AUTO: 4.17 10E6/UL (ref 3.8–5.2)
RSV RNA SPEC NAA+PROBE: NEGATIVE
SARS-COV-2 RNA RESP QL NAA+PROBE: NEGATIVE
SODIUM SERPL-SCNC: 137 MMOL/L (ref 135–145)
TROPONIN T SERPL HS-MCNC: 6 NG/L
WBC # BLD AUTO: 8.3 10E3/UL (ref 4–11)

## 2024-06-30 PROCEDURE — 85025 COMPLETE CBC W/AUTO DIFF WBC: CPT | Performed by: EMERGENCY MEDICINE

## 2024-06-30 PROCEDURE — 71046 X-RAY EXAM CHEST 2 VIEWS: CPT

## 2024-06-30 PROCEDURE — 93005 ELECTROCARDIOGRAM TRACING: CPT | Mod: RTG

## 2024-06-30 PROCEDURE — 87637 SARSCOV2&INF A&B&RSV AMP PRB: CPT | Performed by: EMERGENCY MEDICINE

## 2024-06-30 PROCEDURE — 84484 ASSAY OF TROPONIN QUANT: CPT | Performed by: EMERGENCY MEDICINE

## 2024-06-30 PROCEDURE — 93005 ELECTROCARDIOGRAM TRACING: CPT

## 2024-06-30 PROCEDURE — 36415 COLL VENOUS BLD VENIPUNCTURE: CPT | Performed by: EMERGENCY MEDICINE

## 2024-06-30 PROCEDURE — 80048 BASIC METABOLIC PNL TOTAL CA: CPT | Performed by: EMERGENCY MEDICINE

## 2024-06-30 PROCEDURE — 94640 AIRWAY INHALATION TREATMENT: CPT

## 2024-06-30 PROCEDURE — 250N000009 HC RX 250: Performed by: EMERGENCY MEDICINE

## 2024-06-30 PROCEDURE — 250N000012 HC RX MED GY IP 250 OP 636 PS 637: Performed by: EMERGENCY MEDICINE

## 2024-06-30 PROCEDURE — 99285 EMERGENCY DEPT VISIT HI MDM: CPT | Mod: 25

## 2024-06-30 RX ORDER — ALBUTEROL SULFATE 0.83 MG/ML
2.5 SOLUTION RESPIRATORY (INHALATION) EVERY 4 HOURS PRN
Qty: 75 ML | Refills: 0 | Status: SHIPPED | OUTPATIENT
Start: 2024-06-30

## 2024-06-30 RX ORDER — IPRATROPIUM BROMIDE AND ALBUTEROL SULFATE 2.5; .5 MG/3ML; MG/3ML
3 SOLUTION RESPIRATORY (INHALATION) ONCE
Status: COMPLETED | OUTPATIENT
Start: 2024-06-30 | End: 2024-06-30

## 2024-06-30 RX ORDER — PREDNISONE 20 MG/1
60 TABLET ORAL ONCE
Status: COMPLETED | OUTPATIENT
Start: 2024-06-30 | End: 2024-06-30

## 2024-06-30 RX ORDER — AZITHROMYCIN 250 MG/1
TABLET, FILM COATED ORAL
Qty: 6 TABLET | Refills: 0 | Status: SHIPPED | OUTPATIENT
Start: 2024-06-30 | End: 2024-07-05

## 2024-06-30 RX ORDER — PREDNISONE 20 MG/1
TABLET ORAL
Qty: 10 TABLET | Refills: 0 | Status: SHIPPED | OUTPATIENT
Start: 2024-06-30

## 2024-06-30 RX ADMIN — PREDNISONE 60 MG: 20 TABLET ORAL at 18:20

## 2024-06-30 RX ADMIN — IPRATROPIUM BROMIDE AND ALBUTEROL SULFATE 3 ML: .5; 3 SOLUTION RESPIRATORY (INHALATION) at 18:20

## 2024-06-30 ASSESSMENT — ACTIVITIES OF DAILY LIVING (ADL)
ADLS_ACUITY_SCORE: 33
ADLS_ACUITY_SCORE: 35

## 2024-06-30 ASSESSMENT — COLUMBIA-SUICIDE SEVERITY RATING SCALE - C-SSRS
2. HAVE YOU ACTUALLY HAD ANY THOUGHTS OF KILLING YOURSELF IN THE PAST MONTH?: NO
1. IN THE PAST MONTH, HAVE YOU WISHED YOU WERE DEAD OR WISHED YOU COULD GO TO SLEEP AND NOT WAKE UP?: NO
6. HAVE YOU EVER DONE ANYTHING, STARTED TO DO ANYTHING, OR PREPARED TO DO ANYTHING TO END YOUR LIFE?: NO

## 2024-06-30 NOTE — ED TRIAGE NOTES
Pt comes in with cough, SOB, chest pain x 3 days. Family all had similar viral sx. She made a video visit today and was told to go to . When she went to , they sent her here for CP   Pt with 3/10 CP states it feels like a band. Is more concerned with cough, and congestion/mucus      Triage Assessment (Adult)       Row Name 06/30/24 5359          Triage Assessment    Airway WDL WDL        Respiratory WDL    Respiratory WDL WDL        Skin Circulation/Temperature WDL    Skin Circulation/Temperature WDL WDL        Cardiac WDL    Cardiac WDL X  pain        Peripheral/Neurovascular WDL    Peripheral Neurovascular WDL WDL        Cognitive/Neuro/Behavioral WDL    Cognitive/Neuro/Behavioral WDL WDL

## 2024-06-30 NOTE — ED PROVIDER NOTES
Emergency Department Note      History of Present Illness     Chief Complaint   Chest Pain      HPI   Dot Lujan is a 49 year old female who presents from group home environment where she gets help with ADLs due to reported left sided weakness and other deficits from previous stroke. Pt reports productive nonbloody cough and ongoing headaches (baseline issue). Her caretaker at the bedside had similar sx last week and had negative covid test. Pt reports some low grade subjective fevers. Pt having hoarse voice as well. Mild painful swallowing and sore throat. Mild SOB and pain across BL chest after coughing fit. Pt having both vomiting and diarrhea.     Patient denies immobilization for >3 days or surgery within the previous 4 weeks, history of DVT or PE, hemoptysis, malignancy with treatment within previous 6 months or palliative therapy, or exogenous estrogen use.     Pt tried robitussin and nyquil without relief. Pt has h/o asthma. Pt using PRN albuterol and steroid inhaler daily.    Past Medical History     Medical History and Problem List   Past Medical History:   Diagnosis Date    Allergic rhinitis     Anxiety     Asthma     Depressive disorder     Dyslipidemia     Fibromyalgia     Gastroesophageal reflux disease     Hypertension     Methamphetamine abuse     Migraine     Opioid abuse        Medications   albuterol (PROVENTIL) (2.5 MG/3ML) 0.083% neb solution  azithromycin (ZITHROMAX) 250 MG tablet  predniSONE (DELTASONE) 20 MG tablet  atorvastatin (LIPITOR) 10 MG tablet  diclofenac (VOLTAREN) 1 % topical gel  dicyclomine (BENTYL) 10 MG capsule  gabapentin (NEURONTIN) 100 MG capsule  lisinopril (ZESTRIL) 10 MG tablet  magnesium 250 MG tablet  methocarbamol (ROBAXIN) 500 MG tablet  OLANZapine zydis (ZYPREXA) 5 MG ODT  SUMAtriptan (IMITREX) 50 MG tablet  traZODone (DESYREL) 100 MG tablet        Surgical History   No past surgical history on file.    Physical Exam     Patient Vitals for the past  24 hrs:   BP Temp Temp src Pulse Resp SpO2 Weight   06/30/24 1919 132/85 -- -- 89 17 97 % --   06/30/24 1702 103/68 97.4  F (36.3  C) Temporal 71 16 99 % 68 kg (150 lb)     Physical Exam  VS: Reviewed per above  HENT: Mucous membranes moist. Uvula midline, no tonsillar hypertrophy nor asymmetry. Tolerating secretions, mild hoarse voice. No nuchal rigidity.  EYES: sclera anicteric  CV: Rate as noted,  regular rhythm.   RESP: Effort normal. Mild BL expiratory wheezing  GI: no tenderness/rebound/guarding, not distended.  NEURO: Alert, moving all extremities  MSK: No deformity of the extremities  SKIN: Warm and dry      Diagnostics     Lab Results   Labs Ordered and Resulted from Time of ED Arrival to Time of ED Departure   BASIC METABOLIC PANEL - Abnormal       Result Value    Sodium 137      Potassium 3.7      Chloride 107      Carbon Dioxide (CO2) 17 (*)     Anion Gap 13      Urea Nitrogen 6.0      Creatinine 0.67      GFR Estimate >90      Calcium 8.7      Glucose 101 (*)    CBC WITH PLATELETS AND DIFFERENTIAL - Abnormal    WBC Count 8.3      RBC Count 4.17      Hemoglobin 11.6 (*)     Hematocrit 35.8      MCV 86      MCH 27.8      MCHC 32.4      RDW 14.4      Platelet Count 277      % Neutrophils 72      % Lymphocytes 18      % Monocytes 8      % Eosinophils 1      % Basophils 0      % Immature Granulocytes 1      NRBCs per 100 WBC 0      Absolute Neutrophils 6.0      Absolute Lymphocytes 1.5      Absolute Monocytes 0.7      Absolute Eosinophils 0.1      Absolute Basophils 0.0      Absolute Immature Granulocytes 0.0      Absolute NRBCs 0.0     TROPONIN T, HIGH SENSITIVITY - Normal    Troponin T, High Sensitivity 6     INFLUENZA A/B, RSV, & SARS-COV2 PCR - Normal    Influenza A PCR Negative      Influenza B PCR Negative      RSV PCR Negative      SARS CoV2 PCR Negative         Imaging   XR Chest 2 Views   Final Result   IMPRESSION: Negative chest.          EKG   ECG results from 06/30/24   EKG 12-lead, tracing only      Value    Systolic Blood Pressure     Diastolic Blood Pressure     Ventricular Rate 65    Atrial Rate 65    TX Interval 144    QRS Duration 68        QTc 447    P Axis 42    R AXIS 25    T Axis 13    Interpretation ECG      Sinus rhythm  Normal ECG  When compared with ECG of 08-FEB-2023 19:23,  No significant change was found         ED Course      Medications Administered   Medications   ipratropium - albuterol 0.5 mg/2.5 mg/3 mL (DUONEB) neb solution 3 mL (3 mLs Nebulization $Given 6/30/24 1820)   predniSONE (DELTASONE) tablet 60 mg (60 mg Oral $Given 6/30/24 1820)         Medical Decision Making / Diagnosis         JAIRO Lujan is a 49 year old female who presents to the ER with URI symptoms as well as chest pain with coughing and laryngitis symptoms and shortness of breath.  Vital signs reassuring.  On exam she does have some mild expiratory wheezing bilaterally.    She has long smoking history and reported history of asthma.  No clinical signs of PTA, RPA, epiglottitis. She was given DuoNeb with some subjective improvement in her symptoms.  She is also given prednisone as well.  X-ray clear without signs of pneumonia, pneumothorax, pulmonary edema.  Based on Wells and PERC criteria, lower suspicion for occult PE.  ECG, troponin do not suggest ACS, myocarditis, pericarditis.  Other labs are reassuring.  COVID, influenza, RSV testing is negative, although patient's caregiver at the bedside recently had similar symptoms last week.  Suspect other viral process contributing to her symptoms today.  Plan for discharge with albuterol nebulizer treatment refills, prednisone burst, Z-Maico given smoking history and possible occult COPD.  Primary care follow-up recommended.  Return precautions discussed.    Disposition   The patient was discharged.     Diagnosis     ICD-10-CM    1. Chest pain, unspecified type  R07.9       2. Asthma, unspecified asthma severity, unspecified whether complicated,  unspecified whether persistent  J45.909       3. Upper respiratory tract infection, unspecified type  J06.9       4. Laryngitis  J04.0            Discharge Medications   Discharge Medication List as of 6/30/2024  7:19 PM        START taking these medications    Details   albuterol (PROVENTIL) (2.5 MG/3ML) 0.083% neb solution Take 1 vial (2.5 mg) by nebulization every 4 hours as needed for shortness of breath or wheezing, Disp-75 mL, R-0, E-Prescribe      azithromycin (ZITHROMAX) 250 MG tablet Take 2 tablets (500 mg) by mouth daily for 1 day, THEN 1 tablet (250 mg) daily for 4 days., Disp-6 tablet, R-0, E-Prescribe      predniSONE (DELTASONE) 20 MG tablet Take two tablets (= 40mg) each day for 5 (five) days, Disp-10 tablet, R-0, E-Prescribe              Mauricio Smith MD  07/01/24 0026       Mauricio Smith MD  07/01/24 0031

## 2024-07-01 LAB
ATRIAL RATE - MUSE: 65 BPM
DIASTOLIC BLOOD PRESSURE - MUSE: NORMAL MMHG
INTERPRETATION ECG - MUSE: NORMAL
P AXIS - MUSE: 42 DEGREES
PR INTERVAL - MUSE: 144 MS
QRS DURATION - MUSE: 68 MS
QT - MUSE: 430 MS
QTC - MUSE: 447 MS
R AXIS - MUSE: 25 DEGREES
SYSTOLIC BLOOD PRESSURE - MUSE: NORMAL MMHG
T AXIS - MUSE: 13 DEGREES
VENTRICULAR RATE- MUSE: 65 BPM

## 2024-07-01 NOTE — DISCHARGE INSTRUCTIONS
Discharge Instructions  Upper Respiratory Infection    The upper respiratory tract includes the sinuses, nasal passages, pharynx, and larynx. A URI, or upper respiratory infection, is an infection of any of the parts of the upper airway. Symptoms include runny nose, congestion, sneezing, sore throat, cough, and fever. URIs are almost always caused by a virus. Antibiotics do not help with viral infections, so are generally not prescribed. A URI is very contagious through coughing and nasal secretions; make sure you wash your hands often and clean surfaces after sneezing, coughing or touching them. While you should start to improve in 3 - 5 days, remember that sometimes a cough can linger for several weeks.    Generally, every Emergency Department visit should have a follow-up clinic visit with either a primary or a specialty clinic/provider. Please follow-up as instructed by your emergency provider today.    Return to the Emergency Department if:  Any of your symptoms get much worse.  You seem very sick, like being too weak to get up.  You have chest pain or shortness of breath.   You have a severe headache.  You are vomiting (throwing up) so much you cannot keep fluids or medicines down.  You have confusion or seem unusually drowsy.  You have a seizure.    What can I do to help myself?  Fill any prescriptions the provider gave you and take them right away  If you have a fever, get plenty of rest and drink lots of fluids, especially water.  Using a humidifier or saline nose spray will also help loosen mucous.   Clothes or blankets will not change your fever. Do what is comfortable for you.  Bathing or sponging in lukewarm water may help you feel better.  Acetaminophen (Tylenol ) or ibuprofen (Advil , Motrin ) will help bring fever down and may help you feel more comfortable. Be sure to read and follow the package directions, and ask your provider if you have questions.  Do not drink alcohol.  Decongestants may help  you feel better. You may use decongestant nose sprays Afrin  (oxymetazoline) or Jeronimo-Synephrine  (phenylephrine hydrochloride) for up to 3 days, or may use a decongestant tablet like Sudafed  (pseudoephedrine).  If you were given a prescription for medicine here today, be sure to read all of the information (including the package insert) that comes with your prescription.  This will include important information about the medicine, its side effects, and any warnings that you need to know about.  The pharmacist who fills the prescription can provide more information and answer questions you may have about the medicine.  If you have questions or concerns that the pharmacist cannot address, please call or return to the Emergency Department.   Remember that you can always come back to the Emergency Department if you are not able to see your regular provider in the amount of time listed above, if you get any new symptoms, or if there is anything that worries you.    Discharge Instructions  Asthma    Asthma is a condition causing narrowing and inflammation of the airways that can make it hard to breathe.  Asthma can also cause cough, wheezing, noisy breathing and tightness in the chest.  Asthma can be brought on or  triggered  by many things, including dust, mold, pollen, cigarette smoke, exercise, stress and infections (like the common cold).     Generally, every Emergency Department visit should have a follow-up clinic visit with either a primary or a specialty clinic/provider. Please follow-up as instructed by your emergency provider today.    Return to the Emergency Department if:  Your breathing gets worse.  You need to use your inhaler more often than every 4 hours, or cannot get relief from your inhaler.  You are very weak, or feel much more ill.  You develop new symptoms, such as chest pain.  You cough up blood.  You are vomiting (throwing up) enough that you cannot keep fluids or your medicine down.    What can I  do to help myself?  Fill any prescriptions the provider gave you and take them right away--especially antibiotics. Be sure to finish the whole antibiotic prescription.  You may be given a prescription for an inhaler, which can help loosen tight air passages.  Use this as needed, but not more often than directed. Inhalers work much better when used with a spacer.   You may be given a prescription for a steroid to reduce inflammation. Used long-term, these can have some side effects, but for short-term use they are safe. You may notice restlessness or increased appetite (eating more).      You may use non-prescription cough or cold medicines. Cough medicines may help, but do not make the cough go away completely.   Avoid smoke, because this can make you feel worse. If you smoke, this may be a good time to quit! Consider using nicotine lozenges, gum, or patches to reduce cravings.   If you have a fever, Tylenol  (acetaminophen), Motrin  (ibuprofen), or Advil  (ibuprofen), may help bring fever down and may help you feel more comfortable. Be sure to read and follow the package directions, and ask your provider if you have questions.  Be sure to get your flu shot each year.  For certain age groups, the pneumonia shot can help prevent pneumonia.  It is important that you follow up with your regular provider, to be sure that you are improving from this spell (an acute asthma exacerbation), and also to do what you can to keep from having trouble again. Sometimes you need long-term medicines to keep your asthma under control.   If you were given a prescription for medicine here today, be sure to read all of the information (including the package insert) that comes with your prescription.  This will include important information about the medicine, its side effects, and any warnings that you need to know about.  The pharmacist who fills the prescription can provide more information and answer questions you may have about the  medicine.  If you have questions or concerns that the pharmacist cannot address, please call or return to the Emergency Department.   Remember that you can always come back to the Emergency Department if you are not able to see your regular provider in the amount of time listed above, if you get any new symptoms, or if there is anything that worries you.    Discharge Instructions  Chest Pain    You have been seen today for chest pain or discomfort.  At this time, your provider has found no signs that your chest pain is due to a serious or life-threatening condition, (or you have declined more testing and/or admission to the hospital). However, sometimes there is a serious problem that does not show up right away. Your evaluation today may not be complete and you may need further testing and evaluation.     Generally, every Emergency Department visit should have a follow-up clinic visit with either a primary or a specialty clinic/provider. Please follow-up as instructed by your emergency provider today.  Return to the Emergency Department if:  Your chest pain changes, gets worse, starts to happen more often, or comes with less activity.  You are newly short of breath.  You get very weak or tired.  You pass out or faint.  You have any new symptoms, like fever, cough, numb legs, or you cough up blood.  You have anything else that worries you.    Until you follow-up with your regular provider, please do the following:  Take one aspirin daily unless you have an allergy or are told not to by your provider.  If a stress test appointment has been made, go to the appointment.  If you have questions, contact your regular provider.  Follow-up with your regular provider/clinic as directed; this is very important.    If you were given a prescription for medicine here today, be sure to read all of the information (including the package insert) that comes with your prescription.  This will include important information about the  medicine, its side effects, and any warnings that you need to know about.  The pharmacist who fills the prescription can provide more information and answer questions you may have about the medicine.  If you have questions or concerns that the pharmacist cannot address, please call or return to the Emergency Department.       Remember that you can always come back to the Emergency Department if you are not able to see your regular provider in the amount of time listed above, if you get any new symptoms, or if there is anything that worries you.

## 2024-09-10 ENCOUNTER — APPOINTMENT (OUTPATIENT)
Dept: CT IMAGING | Facility: CLINIC | Age: 49
End: 2024-09-10
Attending: EMERGENCY MEDICINE
Payer: COMMERCIAL

## 2024-09-10 ENCOUNTER — HOSPITAL ENCOUNTER (EMERGENCY)
Facility: CLINIC | Age: 49
Discharge: HOME | End: 2024-09-10
Attending: EMERGENCY MEDICINE | Admitting: EMERGENCY MEDICINE
Payer: COMMERCIAL

## 2024-09-10 ENCOUNTER — APPOINTMENT (OUTPATIENT)
Dept: ULTRASOUND IMAGING | Facility: CLINIC | Age: 49
End: 2024-09-10
Attending: EMERGENCY MEDICINE
Payer: COMMERCIAL

## 2024-09-10 VITALS
HEART RATE: 62 BPM | DIASTOLIC BLOOD PRESSURE: 77 MMHG | RESPIRATION RATE: 20 BRPM | WEIGHT: 166.01 LBS | HEIGHT: 63 IN | TEMPERATURE: 98.1 F | OXYGEN SATURATION: 94 % | BODY MASS INDEX: 29.41 KG/M2 | SYSTOLIC BLOOD PRESSURE: 126 MMHG

## 2024-09-10 DIAGNOSIS — R51.9 ACUTE NONINTRACTABLE HEADACHE, UNSPECIFIED HEADACHE TYPE: ICD-10-CM

## 2024-09-10 DIAGNOSIS — E04.1 THYROID NODULE: ICD-10-CM

## 2024-09-10 LAB
ANION GAP SERPL CALCULATED.3IONS-SCNC: 12 MMOL/L (ref 7–15)
BASOPHILS # BLD AUTO: 0 10E3/UL (ref 0–0.2)
BASOPHILS NFR BLD AUTO: 0 %
BUN SERPL-MCNC: 13.5 MG/DL (ref 6–20)
CALCIUM SERPL-MCNC: 8.8 MG/DL (ref 8.8–10.4)
CHLORIDE SERPL-SCNC: 106 MMOL/L (ref 98–107)
CREAT SERPL-MCNC: 0.78 MG/DL (ref 0.51–0.95)
EGFRCR SERPLBLD CKD-EPI 2021: >90 ML/MIN/1.73M2
EOSINOPHIL # BLD AUTO: 0 10E3/UL (ref 0–0.7)
EOSINOPHIL NFR BLD AUTO: 0 %
ERYTHROCYTE [DISTWIDTH] IN BLOOD BY AUTOMATED COUNT: 14 % (ref 10–15)
FLUAV RNA SPEC QL NAA+PROBE: NEGATIVE
FLUBV RNA RESP QL NAA+PROBE: NEGATIVE
GLUCOSE SERPL-MCNC: 110 MG/DL (ref 70–99)
HCG SERPL QL: NEGATIVE
HCO3 SERPL-SCNC: 18 MMOL/L (ref 22–29)
HCT VFR BLD AUTO: 37.8 % (ref 35–47)
HGB BLD-MCNC: 12.1 G/DL (ref 11.7–15.7)
HOLD SPECIMEN: NORMAL
IMM GRANULOCYTES # BLD: 0 10E3/UL
IMM GRANULOCYTES NFR BLD: 0 %
LYMPHOCYTES # BLD AUTO: 1.5 10E3/UL (ref 0.8–5.3)
LYMPHOCYTES NFR BLD AUTO: 16 %
MCH RBC QN AUTO: 27.5 PG (ref 26.5–33)
MCHC RBC AUTO-ENTMCNC: 32 G/DL (ref 31.5–36.5)
MCV RBC AUTO: 86 FL (ref 78–100)
MONOCYTES # BLD AUTO: 0.4 10E3/UL (ref 0–1.3)
MONOCYTES NFR BLD AUTO: 5 %
NEUTROPHILS # BLD AUTO: 7.4 10E3/UL (ref 1.6–8.3)
NEUTROPHILS NFR BLD AUTO: 79 %
NRBC # BLD AUTO: 0 10E3/UL
NRBC BLD AUTO-RTO: 0 /100
PLATELET # BLD AUTO: 242 10E3/UL (ref 150–450)
POTASSIUM SERPL-SCNC: 4 MMOL/L (ref 3.4–5.3)
RBC # BLD AUTO: 4.4 10E6/UL (ref 3.8–5.2)
RSV RNA SPEC NAA+PROBE: NEGATIVE
SARS-COV-2 RNA RESP QL NAA+PROBE: NEGATIVE
SODIUM SERPL-SCNC: 136 MMOL/L (ref 135–145)
WBC # BLD AUTO: 9.4 10E3/UL (ref 4–11)

## 2024-09-10 PROCEDURE — 85025 COMPLETE CBC W/AUTO DIFF WBC: CPT | Performed by: EMERGENCY MEDICINE

## 2024-09-10 PROCEDURE — 76830 TRANSVAGINAL US NON-OB: CPT

## 2024-09-10 PROCEDURE — 36415 COLL VENOUS BLD VENIPUNCTURE: CPT | Performed by: EMERGENCY MEDICINE

## 2024-09-10 PROCEDURE — 250N000011 HC RX IP 250 OP 636: Performed by: EMERGENCY MEDICINE

## 2024-09-10 PROCEDURE — 84703 CHORIONIC GONADOTROPIN ASSAY: CPT | Performed by: EMERGENCY MEDICINE

## 2024-09-10 PROCEDURE — 96365 THER/PROPH/DIAG IV INF INIT: CPT | Mod: 59

## 2024-09-10 PROCEDURE — 70450 CT HEAD/BRAIN W/O DYE: CPT

## 2024-09-10 PROCEDURE — 258N000003 HC RX IP 258 OP 636: Performed by: EMERGENCY MEDICINE

## 2024-09-10 PROCEDURE — 87637 SARSCOV2&INF A&B&RSV AMP PRB: CPT | Performed by: EMERGENCY MEDICINE

## 2024-09-10 PROCEDURE — 70496 CT ANGIOGRAPHY HEAD: CPT

## 2024-09-10 PROCEDURE — 96361 HYDRATE IV INFUSION ADD-ON: CPT

## 2024-09-10 PROCEDURE — 250N000013 HC RX MED GY IP 250 OP 250 PS 637: Performed by: EMERGENCY MEDICINE

## 2024-09-10 PROCEDURE — 96375 TX/PRO/DX INJ NEW DRUG ADDON: CPT | Mod: 59

## 2024-09-10 PROCEDURE — 80048 BASIC METABOLIC PNL TOTAL CA: CPT | Performed by: EMERGENCY MEDICINE

## 2024-09-10 PROCEDURE — 99285 EMERGENCY DEPT VISIT HI MDM: CPT | Mod: 25

## 2024-09-10 PROCEDURE — 76856 US EXAM PELVIC COMPLETE: CPT

## 2024-09-10 PROCEDURE — 85004 AUTOMATED DIFF WBC COUNT: CPT | Performed by: EMERGENCY MEDICINE

## 2024-09-10 RX ORDER — MAGNESIUM SULFATE HEPTAHYDRATE 40 MG/ML
2 INJECTION, SOLUTION INTRAVENOUS ONCE
Status: COMPLETED | OUTPATIENT
Start: 2024-09-10 | End: 2024-09-10

## 2024-09-10 RX ORDER — BUTALBITAL, ACETAMINOPHEN AND CAFFEINE 50; 325; 40 MG/1; MG/1; MG/1
1 TABLET ORAL ONCE
Status: COMPLETED | OUTPATIENT
Start: 2024-09-10 | End: 2024-09-10

## 2024-09-10 RX ORDER — IOPAMIDOL 755 MG/ML
500 INJECTION, SOLUTION INTRAVASCULAR ONCE
Status: COMPLETED | OUTPATIENT
Start: 2024-09-10 | End: 2024-09-10

## 2024-09-10 RX ORDER — SUMATRIPTAN 50 MG/1
50 TABLET, FILM COATED ORAL
Qty: 10 TABLET | Refills: 0 | Status: SHIPPED | OUTPATIENT
Start: 2024-09-10

## 2024-09-10 RX ORDER — DEXAMETHASONE SODIUM PHOSPHATE 10 MG/ML
10 INJECTION, SOLUTION INTRAMUSCULAR; INTRAVENOUS ONCE
Status: COMPLETED | OUTPATIENT
Start: 2024-09-10 | End: 2024-09-10

## 2024-09-10 RX ORDER — DIPHENHYDRAMINE HYDROCHLORIDE 50 MG/ML
25 INJECTION INTRAMUSCULAR; INTRAVENOUS ONCE
Status: COMPLETED | OUTPATIENT
Start: 2024-09-10 | End: 2024-09-10

## 2024-09-10 RX ORDER — GADOBUTROL 604.72 MG/ML
7.5 INJECTION INTRAVENOUS ONCE
Status: DISCONTINUED | OUTPATIENT
Start: 2024-09-10 | End: 2024-09-11 | Stop reason: HOSPADM

## 2024-09-10 RX ORDER — METOCLOPRAMIDE HYDROCHLORIDE 5 MG/ML
10 INJECTION INTRAMUSCULAR; INTRAVENOUS ONCE
Status: COMPLETED | OUTPATIENT
Start: 2024-09-10 | End: 2024-09-10

## 2024-09-10 RX ADMIN — SODIUM CHLORIDE 1000 ML: 9 INJECTION, SOLUTION INTRAVENOUS at 18:06

## 2024-09-10 RX ADMIN — MAGNESIUM SULFATE HEPTAHYDRATE 2 G: 40 INJECTION, SOLUTION INTRAVENOUS at 20:51

## 2024-09-10 RX ADMIN — METOCLOPRAMIDE 10 MG: 5 INJECTION, SOLUTION INTRAMUSCULAR; INTRAVENOUS at 18:06

## 2024-09-10 RX ADMIN — IOPAMIDOL 67 ML: 755 INJECTION, SOLUTION INTRAVENOUS at 18:24

## 2024-09-10 RX ADMIN — DIPHENHYDRAMINE HYDROCHLORIDE 25 MG: 50 INJECTION, SOLUTION INTRAMUSCULAR; INTRAVENOUS at 18:06

## 2024-09-10 RX ADMIN — BUTALBITAL, ACETAMINOPHEN, AND CAFFEINE 1 TABLET: 50; 325; 40 TABLET ORAL at 20:53

## 2024-09-10 RX ADMIN — DEXAMETHASONE SODIUM PHOSPHATE 10 MG: 10 INJECTION, SOLUTION INTRAMUSCULAR; INTRAVENOUS at 18:07

## 2024-09-10 ASSESSMENT — ACTIVITIES OF DAILY LIVING (ADL)
ADLS_ACUITY_SCORE: 35

## 2024-09-10 NOTE — ED PROVIDER NOTES
"Emergency Department Note      Code Status: No Order    History of Present Illness     Chief Complaint:  Headache       HPI   Dot Lujan is a 49 year old female with a history of stroke, hyperlipidemia, migraines, and hypertension who presents to the ED for evaluation of a headache. The patient states she started to develop a severe global headache 3 days ago around the same time her menstrual cycle started. States she sneezed earlier today which made the headache significantly worse and she has had double vision since 0800. She has tried Tylenol, ibuprofen, and a \"[employer] headache medication\" with no relief. She does have a prescription for Imitrex but is currently out of the medication. States her current vaginal bleeding is heavier than what is normal for her. She does have a history of headaches with her menstrual cycles. Endorses nausea today which she has resolved with ondansetron. No vomiting. She has residual left-sided weakness from a stroke in her 20's (possibly due to high cholesterol) so she ambulates with a cane at baseline. She is not currently anticoagulated or taking aspirin but she is taking atorvastatin as recommended by her doctor. Denies recent head injury.     Independent Historian:    None    Review of External Notes  PCP note 9/10/24: New clinic patient.  Patient notes throbbing headache pain that is also shooting through the forehead and the top of the head.  This reportedly started 3 days ago while on an airplane.  She also notes some double and blurred vision which is not typical for her headaches.  She has left-sided weakness that comes and goes with the headaches.  She was sent to the ED for further evaluation.    Past Medical History   Medical History, Surgical History, Problem List, and Medications  Reviewed in Epic    Physical Exam   Patient Vitals for the past 24 hrs:   BP Temp Temp src Pulse Resp SpO2 Height Weight   09/10/24 2202 126/77 -- -- 62 -- 94 % -- -- " "  09/10/24 2142 117/70 -- -- -- -- 93 % -- --   09/10/24 2133 117/71 -- -- 64 -- 95 % -- --   09/10/24 1455 126/63 98.1  F (36.7  C) Temporal 67 20 98 % -- --   09/10/24 1452 -- -- -- -- -- -- 1.6 m (5' 3\") 75.3 kg (166 lb 0.1 oz)       Physical Exam  Constitutional: Vital signs reviewed as above.   Eyes: PEERL, EOMI B/L  Neck: No JVD noted. FROM   Cardiovascular: normal rate, Regular rhythm and normal heart sounds.  No murmur heard. Equal B/L peripheral pulses.  Pulmonary/Chest: Effort normal and breath sounds normal. No respiratory distress. Patient has no wheezes. Patient has no rales.   Gastrointestinal: Soft. There is no tenderness.   Musculoskeletal/Extremities: No pitting edema noted. Normal tone.  Skin: Skin is warm and dry. There is no diaphoresis noted.   Psychiatric: The patient appears calm.   Neurological:    No nystagmus noted  Patient is alert and oriented to person, place, and time.    Speech is fluent, cognition is normal.   CN 2-12 intact (PERRL, EOMI, symmetric smile, equal eye squeeze and forehead raise, normal and equal sensation to bilateral forehead/cheek/chin, grossly equal hearing B/L, midline tongue protrusion with nl side-to-side movement, normal shoulder shrug).    RUE strength 4/5: , finger abd, wrist flex/ext, elbow flex/ext.    LUE strength 4/5: , finger abd, wrist flex/ext, elbow flex/ext.    RLE strength 5/5: ankle flex/ext, knee flex/ext, hip flex.    LLE strength 4/5: ankle flex/ext, knee flex/ext, hip flex.    Sensation equal in all 4 extremities.    No arm drift.     Cerebellar: Normal rapid alternating movements     ( finger-nose-finger, rapid pronation/supination, hand rolling)    Normal heel-to-shin   Stable gait with cane as observed in the ED.     Diagnostics     Laboratory: Imaging:   Labs Ordered and Resulted from Time of ED Arrival to Time of ED Departure   BASIC METABOLIC PANEL - Abnormal       Result Value    Sodium 136      Potassium 4.0      Chloride 106      " Carbon Dioxide (CO2) 18 (*)     Anion Gap 12      Urea Nitrogen 13.5      Creatinine 0.78      GFR Estimate >90      Calcium 8.8      Glucose 110 (*)    HCG QUALITATIVE PREGNANCY - Normal    hCG Serum Qualitative Negative     INFLUENZA A/B, RSV, & SARS-COV2 PCR - Normal    Influenza A PCR Negative      Influenza B PCR Negative      RSV PCR Negative      SARS CoV2 PCR Negative     CBC WITH PLATELETS AND DIFFERENTIAL    WBC Count 9.4      RBC Count 4.40      Hemoglobin 12.1      Hematocrit 37.8      MCV 86      MCH 27.5      MCHC 32.0      RDW 14.0      Platelet Count 242      % Neutrophils 79      % Lymphocytes 16      % Monocytes 5      % Eosinophils 0      % Basophils 0      % Immature Granulocytes 0      NRBCs per 100 WBC 0      Absolute Neutrophils 7.4      Absolute Lymphocytes 1.5      Absolute Monocytes 0.4      Absolute Eosinophils 0.0      Absolute Basophils 0.0      Absolute Immature Granulocytes 0.0      Absolute NRBCs 0.0       US Pelvic Complete w Transvaginal   Final Result   IMPRESSION:   1.  Normal pelvic ultrasound.               CTA Head Neck with Contrast   Final Result    IMPRESSION:    HEAD CTA:    1.  No significant stenosis or occlusion.   2.  No brain aneurysm.   3.  No AVM/AVF.      NECK CTA:   1.  No significant stenosis or occlusion.   2.  No cervical artery dissection.   3.  Heterogeneous thyroid gland. Suspect left thyroid nodule. Recommend nonemergent thyroid ultrasound.      Head CT w/o contrast   Final Result   IMPRESSION:   1.  No acute intracranial process.            Independent Interpretation  See ED course    ED Course    Medications Administered  Medications   gadobutrol (GADAVIST) injection 7.5 mL (has no administration in time range)   metoclopramide (REGLAN) injection 10 mg (10 mg Intravenous $Given 9/10/24 1806)   diphenhydrAMINE (BENADRYL) injection 25 mg (25 mg Intravenous $Given 9/10/24 1806)   dexAMETHasone PF (DECADRON) injection 10 mg (10 mg Intravenous $Given 9/10/24  1807)   sodium chloride 0.9% BOLUS 1,000 mL (0 mLs Intravenous Stopped 9/10/24 2206)   iopamidol (ISOVUE-370) solution 500 mL (67 mLs Intravenous $Given 9/10/24 1824)   sodium chloride (PF) 0.9% PF flush 100 mL (80 mLs Intravenous $Given 9/10/24 1824)   magnesium sulfate 2 g in 50 mL sterile water intermittent infusion (0 g Intravenous Stopped 9/10/24 2206)   butalbital-acetaminophen-caffeine (ESGIC) per tablet 1 tablet (1 tablet Oral $Given 9/10/24 2053)       Procedures  Procedures     Discussion of Management  See ED Course    ED Course  ED Course as of 09/10/24 2216   Tue Sep 10, 2024   1752 I obtained history and performed a physical exam as noted above.    2156 Rechecked and updated.  Headache better.  Patient feels better and would like to go home.       Optional/Additional Documentation: None    Medical Decision Making / Diagnosis     MIPS     None    Medical Decision Making:  Dot Lujan is a 49 year old female presented with a headache.  Evaluation in the emergency department, fortunately has been negative. The patient has not had any fever or neck stiffness so I doubt meningitis. The headache has improved.   There is no associated numbness, paresthesia or confusion and I doubt stroke or CNS tumor.  She has some degree of intermittent left lower extremity weakness.  That has improved as her headache was treated.  She does not feel any different than baseline.      The patient was treated symptomatically and pain has improved with medication interventions. The patient received neuroimaging that did not demonstrate a mass, aneurysm, or stroke. As the patient has been improving and in light of the negative workup, discharge to home is reasonable. The patient should follow-up with primary care regarding this visit but also specifically for follow-up with her thyroid nodule (this finding on the CT has been communicated to her).    If the headache continues or the frequency increases, consultation  with neurology will be indicated.  Anticipatory guidance given prior to discharge.     Critical Care:  None.    Disposition:  See ED Course and MDM    ICD-10 Codes:    ICD-10-CM    1. Acute nonintractable headache, unspecified headache type  R51.9       2. Thyroid nodule  E04.1            Discharge Medications:  Current Discharge Medication List         Scribe Disclosure:  WENDY, Anna Maguire, am serving as a scribe at 5:44 PM on 9/10/2024 to document services personally performed by Jalen Paredes DO based on my observations and the provider's statements to me.    9/10/2024   Jalen Paredes DO     [employer]                    Jalen Paredes DO  09/10/24 1783

## 2024-09-10 NOTE — ED TRIAGE NOTES
Pt here from clinic for headache since Saturday. Hx of migraines. Today has had intermittent double vision since 0800. Did receive zofran pta. Also endorses heavy vaginal bleeding w/ her period which is abnormal for her. Denies any chance for pregnancy. Is ambulatory into triage.

## 2024-09-11 NOTE — DISCHARGE INSTRUCTIONS
What do you do next:   Call your primary care clinic and talk with them about an outpatient ultrasound of your thyroid gland.  Continue your home medications unless we have specifically changed them  I represcribed your Imitrex as you requested.  You can use over-the-counter acetaminophen (Tylenol ) and ibuprofen for fever or pain control as applicable to your visit today.  Acetaminophen (Tylenol): Take 500 to 1000 mg by mouth every 6 hours as needed for fever or pain.  Do not take more than 4000 total milligrams of acetaminophen-containing products in a 24-hour timeframe.  Ibuprofen: Take 600 milligrams by mouth every 6-8 hours as needed for fever or pain.  Take this with food or milk to avoid stomach upset.  Follow up as indicated below    When do you return: Review your discharge papers for specifics on reasons to return.    Thank you for allowing us to care for you today.

## 2024-09-11 NOTE — ED NOTES
[facility] home staff spoke with this RN and a staff member is able to come  patient now rather than receiving wheel chair ride back home.

## 2024-11-02 ENCOUNTER — HOSPITAL ENCOUNTER (EMERGENCY)
Facility: CLINIC | Age: 49
Discharge: HOME | End: 2024-11-02
Attending: STUDENT IN AN ORGANIZED HEALTH CARE EDUCATION/TRAINING PROGRAM | Admitting: STUDENT IN AN ORGANIZED HEALTH CARE EDUCATION/TRAINING PROGRAM
Payer: COMMERCIAL

## 2024-11-02 VITALS
TEMPERATURE: 97.8 F | WEIGHT: 172.84 LBS | HEIGHT: 63 IN | SYSTOLIC BLOOD PRESSURE: 108 MMHG | OXYGEN SATURATION: 95 % | RESPIRATION RATE: 10 BRPM | DIASTOLIC BLOOD PRESSURE: 60 MMHG | BODY MASS INDEX: 30.62 KG/M2 | HEART RATE: 66 BPM

## 2024-11-02 DIAGNOSIS — R51.9 ACUTE NONINTRACTABLE HEADACHE, UNSPECIFIED HEADACHE TYPE: ICD-10-CM

## 2024-11-02 PROCEDURE — 258N000003 HC RX IP 258 OP 636: Performed by: STUDENT IN AN ORGANIZED HEALTH CARE EDUCATION/TRAINING PROGRAM

## 2024-11-02 PROCEDURE — 99284 EMERGENCY DEPT VISIT MOD MDM: CPT | Mod: 25

## 2024-11-02 PROCEDURE — 250N000011 HC RX IP 250 OP 636: Performed by: STUDENT IN AN ORGANIZED HEALTH CARE EDUCATION/TRAINING PROGRAM

## 2024-11-02 PROCEDURE — 96374 THER/PROPH/DIAG INJ IV PUSH: CPT

## 2024-11-02 PROCEDURE — 250N000013 HC RX MED GY IP 250 OP 250 PS 637: Performed by: STUDENT IN AN ORGANIZED HEALTH CARE EDUCATION/TRAINING PROGRAM

## 2024-11-02 PROCEDURE — 96375 TX/PRO/DX INJ NEW DRUG ADDON: CPT

## 2024-11-02 PROCEDURE — 96361 HYDRATE IV INFUSION ADD-ON: CPT

## 2024-11-02 RX ORDER — KETOROLAC TROMETHAMINE 15 MG/ML
15 INJECTION, SOLUTION INTRAMUSCULAR; INTRAVENOUS ONCE
Status: COMPLETED | OUTPATIENT
Start: 2024-11-02 | End: 2024-11-02

## 2024-11-02 RX ORDER — METOCLOPRAMIDE HYDROCHLORIDE 5 MG/ML
10 INJECTION INTRAMUSCULAR; INTRAVENOUS ONCE
Status: COMPLETED | OUTPATIENT
Start: 2024-11-02 | End: 2024-11-02

## 2024-11-02 RX ORDER — OLANZAPINE 5 MG/1
5 TABLET, ORALLY DISINTEGRATING ORAL AT BEDTIME
Status: DISCONTINUED | OUTPATIENT
Start: 2024-11-02 | End: 2024-11-02 | Stop reason: HOSPADM

## 2024-11-02 RX ORDER — ACETAMINOPHEN 500 MG
1000 TABLET ORAL ONCE
Status: DISCONTINUED | OUTPATIENT
Start: 2024-11-02 | End: 2024-11-02

## 2024-11-02 RX ORDER — ACETAMINOPHEN 325 MG/1
975 TABLET ORAL ONCE
Status: COMPLETED | OUTPATIENT
Start: 2024-11-02 | End: 2024-11-02

## 2024-11-02 RX ORDER — DEXAMETHASONE SODIUM PHOSPHATE 10 MG/ML
4 INJECTION, SOLUTION INTRAMUSCULAR; INTRAVENOUS ONCE
Status: COMPLETED | OUTPATIENT
Start: 2024-11-02 | End: 2024-11-02

## 2024-11-02 RX ADMIN — DEXAMETHASONE SODIUM PHOSPHATE 4 MG: 10 INJECTION, SOLUTION INTRAMUSCULAR; INTRAVENOUS at 16:27

## 2024-11-02 RX ADMIN — METOCLOPRAMIDE HYDROCHLORIDE 10 MG: 5 INJECTION INTRAMUSCULAR; INTRAVENOUS at 16:27

## 2024-11-02 RX ADMIN — KETOROLAC TROMETHAMINE 15 MG: 15 INJECTION, SOLUTION INTRAMUSCULAR; INTRAVENOUS at 18:48

## 2024-11-02 RX ADMIN — ACETAMINOPHEN 975 MG: 325 TABLET, FILM COATED ORAL at 16:09

## 2024-11-02 RX ADMIN — SODIUM CHLORIDE 1000 ML: 9 INJECTION, SOLUTION INTRAVENOUS at 16:27

## 2024-11-02 ASSESSMENT — COLUMBIA-SUICIDE SEVERITY RATING SCALE - C-SSRS
6. HAVE YOU EVER DONE ANYTHING, STARTED TO DO ANYTHING, OR PREPARED TO DO ANYTHING TO END YOUR LIFE?: NO
1. IN THE PAST MONTH, HAVE YOU WISHED YOU WERE DEAD OR WISHED YOU COULD GO TO SLEEP AND NOT WAKE UP?: NO
2. HAVE YOU ACTUALLY HAD ANY THOUGHTS OF KILLING YOURSELF IN THE PAST MONTH?: NO

## 2024-11-02 ASSESSMENT — ACTIVITIES OF DAILY LIVING (ADL)
ADLS_ACUITY_SCORE: 0

## 2024-11-02 NOTE — ED PROVIDER NOTES
"  Emergency Department Note      History of Present Illness   Chief Complaint   Headache (/)    HPI   Dot Lujan is a 49 year old female with a history of hypertension and migraines who presents with her caregiver for an evaluation of a headache. The patient stated she gets headaches everyday and this one has progressively worsened over the past few days. She stated it is located at the back of her head and radiates forward. She stated having nausea and dry heaving this morning. She stated having normally vision. She stated having a little bit of tingling in the first three fingers on both hands. She denies hitting her head. She also denies fevers. She noted she has seen a neurologist. She noted her left side is weaker since having a stroke and she now needs a cane.  Headache was gradual in onset.    Independent Historian   None    Review of External Notes   I reviewed a CTA of her head and neck from 09/10/2024. I reviewed the nurse triage note from today.   Past Medical History   Medical History and Problem List   Anxiety   Asthma  Depression  Dyslipidemia  Fibromyalgia  GERD  Hypertension  Methamphetamine abuse  Migraine  Bilateral occipital neuralgia   IBS   Hemorrhoids     Medications   Albuterol  Methocarbamol  Olanzapine   Sumatriptan  Atorvastatin  Gabapentin  Lisinopril  Trazodone     Surgical History    section  Ovarian cyst removal  Appendectomy   Physical Exam   Patient Vitals for the past 24 hrs:   BP Temp Temp src Pulse Resp SpO2 Height Weight   24 1935 108/60 -- -- 66 10 95 % -- --   24 1922 105/48 -- -- 72 (!) 8 96 % -- --   24 1907 103/58 -- -- 66 17 95 % -- --   24 1852 99/59 -- -- 64 26 95 % -- --   24 1602 108/65 97.8  F (36.6  C) Oral 86 20 95 % 1.6 m (5' 3\") 78.4 kg (172 lb 13.5 oz)     Physical Exam  GENERAL: Patient sitting, wearing sunglasses and noise cancelling headphones.   HEAD: Atraumatic.  EYES: Anicteric. PERRL  NOSE: No active " bleeding  MOUTH: Moist mucosa  THROAT: Patent airway.   NECK: No rigidity  CV: RRR, no murmurs, rubs or gallops  PULM: CTAB with good aeration; no retractions, rales, rhonchi, or wheezing  ABD: Soft, nontender, nondistended, no guarding, no peritoneal signs   DERM: No rash. Skin warm and dry  EXTREMITY: Moving all extremities without difficulty. No calf tenderness or peripheral edema  VASCULAR: Symmetric pulses bilaterally  NEURO: A,Ox3. CN 2-12 fully intact. Strength 4+/5 LLE baseline, otherwise strength 5/5 bilateral upper extremities and right lower extremity.  Sensation fully intact to light touch symmetrically bilateral LE/UE. Normal finger-to-nose and heel to shin. No nystagmus.   Diagnostics   Lab Results   None     Imaging   None     EKG   None     Independent Interpretation   None  ED Course    Medications Administered   Medications   OLANZapine zydis (zyPREXA) ODT tab 5 mg (has no administration in time range)   acetaminophen (TYLENOL) tablet 975 mg (975 mg Oral $Given 11/2/24 1609)   sodium chloride 0.9% BOLUS 1,000 mL (0 mLs Intravenous Stopped 11/2/24 1740)   metoclopramide (REGLAN) injection 10 mg (10 mg Intravenous $Given 11/2/24 1627)   dexAMETHasone PF (DECADRON) injection 4 mg (4 mg Intravenous $Given 11/2/24 1627)   ketorolac (TORADOL) injection 15 mg (15 mg Intravenous $Given 11/2/24 1848)     Procedures   None      Discussion of Management   None    ED Course   ED Course as of 11/02/24 1941   Sat Nov 02, 2024   1612 I obtained history and examined the patient as noted above.      Additional Documentation  None  Medical Decision Making / Diagnosis   CMS Diagnoses: None    MIPS       None    MDM   Dot Lujan is a 49 year old female     Symptoms most consistent with primary headache.     Chronic conditions contributing - migraines after CVA.     DDx considered intracranial bleed, CVA, acute angle closure glaucoma, meningitis, and other secondary cause of headache, however evaluation  not consistent with these etiologies.     Neuroexam-no acute process.  No nuchal rigidity.  Has had recent brain imaging that was reassuring.    Therefore, considered imaging, but not indicated.     Treated with with tylenol, compazine, IVF, Toradol, and decadron    Patient felt much better and wanted to go home.     I have evaluated the patient for acute medical emergencies and have clinically decided no further acute medical interventions are required. Patient stable for discharge. All questions answered. Given strict return precautions. Patient content with plan. The differential diagnosis and treatment modalities were discussed thoroughly with the patient. Recommended PCP follow-up in 2-3 days.      Disposition   The patient was discharged.     Diagnosis     ICD-10-CM    1. Acute nonintractable headache, unspecified headache type  R51.9          Discharge Medications   New Prescriptions    No medications on file     Scribe Disclosure:  I, Sara De La Cruz, am serving as a scribe at 5:29 PM on 11/2/2024 to document services personally performed by Marvel Christina MD based on my observations and the provider's statements to me.      Marvel Christina MD  11/02/24 2012

## 2024-11-02 NOTE — ED TRIAGE NOTES
Pt reports migraine since Tuesday, took imitrex yesterday with some relief but none today. Took tylenol PM this am around 0900 hoping to sleep it off. Pt presents here with caregiver. Pt is her own decision maker. Unable to take NSAIDs. Feels weak on left side and is experiencing auras. Dry heave this am but was able to keep breakfast down. Light sensitivity and has ear plugs in currently because noises are bothering her. Endorses dizziness currently but walks independently with cane at baseline.     Triage Assessment (Adult)       Row Name 11/02/24 1600          Triage Assessment    Airway WDL WDL        Respiratory WDL    Respiratory WDL WDL        Skin Circulation/Temperature WDL    Skin Circulation/Temperature WDL WDL        Cardiac WDL    Cardiac WDL WDL        Peripheral/Neurovascular WDL    Peripheral Neurovascular WDL WDL        Cognitive/Neuro/Behavioral WDL    Cognitive/Neuro/Behavioral WDL WDL

## 2024-11-03 NOTE — DISCHARGE INSTRUCTIONS
Return to the emergency department if symptoms are worsening, become concerning, or for any other concerns. Follow-up with your doctor routinely.       Discharge Instructions  Headache    You were seen today for a headache. Headaches may be caused by many different things such as muscle tension, sinus inflammation, anxiety and stress, having too little sleep, too much alcohol, some medical conditions or injury. You may have a migraine, which is caused by changes in the blood vessels in your head.  At this time your provider does not find that your headache is a sign of anything dangerous or life-threatening.  However, sometimes the signs of serious illness do not show up right away.      Generally, every Emergency Department visit should have a follow-up clinic visit with either a primary or a specialty clinic/provider. Please follow-up as instructed by your emergency provider today.    Return to the Emergency Department if:  You get a new fever of 100.4 F or higher.  Your headache gets much worse.  You get a stiff neck with your headache.  You get a new headache that is significantly different or worse than headaches you have had before.  You are vomiting (throwing up) and cannot keep food or water down.  You have blurry or double vision or other problems with your eyes.  You have a new weakness on one side of your body.  You have difficulty with balance which is new.  You or your family thinks you are confused.  You have a seizure.    What can I do to help myself?  Pain medications - You may take a pain medication such as Tylenol  (acetaminophen), Advil , Motrin  (ibuprofen) or Aleve  (naproxen).  Take a pain reliever as soon as you notice symptoms.  Starting medications as soon as you start to have symptoms may lessen the amount of pain you have.  Relaxing in a quiet, dark room may help.  Get enough sleep and eat meals regularly.  You may need to watch for certain foods or other things which may trigger your  headaches.  Keeping a journal of your headaches and possible triggers may help you and your primary provider to identify things which you should avoid which may be causing your headaches.  If you were given a prescription for medicine here today, be sure to read all of the information (including the package insert) that comes with your prescription.  This will include important information about the medicine, its side effects, and any warnings that you need to know about.  The pharmacist who fills the prescription can provide more information and answer questions you may have about the medicine.  If you have questions or concerns that the pharmacist cannot address, please call or return to the Emergency Department.   Remember that you can always come back to the Emergency Department if you are not able to see your regular provider in the amount of time listed above, if you get any new symptoms, or if there is anything that worries you.

## 2024-11-24 ENCOUNTER — HOSPITAL ENCOUNTER (EMERGENCY)
Facility: CLINIC | Age: 49
Discharge: HOME | End: 2024-11-24
Attending: EMERGENCY MEDICINE | Admitting: EMERGENCY MEDICINE
Payer: COMMERCIAL

## 2024-11-24 VITALS
BODY MASS INDEX: 31.13 KG/M2 | SYSTOLIC BLOOD PRESSURE: 107 MMHG | RESPIRATION RATE: 16 BRPM | OXYGEN SATURATION: 94 % | DIASTOLIC BLOOD PRESSURE: 67 MMHG | WEIGHT: 175.71 LBS | TEMPERATURE: 98 F | HEART RATE: 72 BPM | HEIGHT: 63 IN

## 2024-11-24 DIAGNOSIS — R51.9 ACUTE NONINTRACTABLE HEADACHE, UNSPECIFIED HEADACHE TYPE: ICD-10-CM

## 2024-11-24 DIAGNOSIS — H66.93 ACUTE BILATERAL OTITIS MEDIA: ICD-10-CM

## 2024-11-24 PROCEDURE — 250N000011 HC RX IP 250 OP 636: Performed by: EMERGENCY MEDICINE

## 2024-11-24 PROCEDURE — 96375 TX/PRO/DX INJ NEW DRUG ADDON: CPT

## 2024-11-24 PROCEDURE — 250N000013 HC RX MED GY IP 250 OP 250 PS 637: Performed by: EMERGENCY MEDICINE

## 2024-11-24 PROCEDURE — 96361 HYDRATE IV INFUSION ADD-ON: CPT

## 2024-11-24 PROCEDURE — 96374 THER/PROPH/DIAG INJ IV PUSH: CPT

## 2024-11-24 PROCEDURE — 99284 EMERGENCY DEPT VISIT MOD MDM: CPT | Mod: 25

## 2024-11-24 PROCEDURE — 258N000003 HC RX IP 258 OP 636: Performed by: EMERGENCY MEDICINE

## 2024-11-24 RX ORDER — DEXAMETHASONE SODIUM PHOSPHATE 10 MG/ML
10 INJECTION, SOLUTION INTRAMUSCULAR; INTRAVENOUS ONCE
Status: COMPLETED | OUTPATIENT
Start: 2024-11-24 | End: 2024-11-24

## 2024-11-24 RX ORDER — TETRACAINE HYDROCHLORIDE 5 MG/ML
SOLUTION OPHTHALMIC
Status: DISCONTINUED
Start: 2024-11-24 | End: 2024-11-25 | Stop reason: HOSPADM

## 2024-11-24 RX ORDER — LORAZEPAM 2 MG/ML
1 INJECTION INTRAMUSCULAR ONCE
Status: COMPLETED | OUTPATIENT
Start: 2024-11-24 | End: 2024-11-24

## 2024-11-24 RX ORDER — KETOROLAC TROMETHAMINE 15 MG/ML
15 INJECTION, SOLUTION INTRAMUSCULAR; INTRAVENOUS ONCE
Status: COMPLETED | OUTPATIENT
Start: 2024-11-24 | End: 2024-11-24

## 2024-11-24 RX ORDER — METOCLOPRAMIDE HYDROCHLORIDE 5 MG/ML
10 INJECTION INTRAMUSCULAR; INTRAVENOUS ONCE
Status: COMPLETED | OUTPATIENT
Start: 2024-11-24 | End: 2024-11-24

## 2024-11-24 RX ORDER — MORPHINE SULFATE 4 MG/ML
4 INJECTION, SOLUTION INTRAMUSCULAR; INTRAVENOUS ONCE
Status: DISCONTINUED | OUTPATIENT
Start: 2024-11-24 | End: 2024-11-24

## 2024-11-24 RX ORDER — AMOXICILLIN 500 MG/1
500 CAPSULE ORAL 3 TIMES DAILY
Qty: 30 CAPSULE | Refills: 0 | Status: SHIPPED | OUTPATIENT
Start: 2024-11-24 | End: 2024-12-04

## 2024-11-24 RX ORDER — FERROUS SULFATE 325(65) MG
325 TABLET ORAL
COMMUNITY

## 2024-11-24 RX ORDER — DIPHENHYDRAMINE HYDROCHLORIDE 50 MG/ML
25 INJECTION INTRAMUSCULAR; INTRAVENOUS ONCE
Status: COMPLETED | OUTPATIENT
Start: 2024-11-24 | End: 2024-11-24

## 2024-11-24 RX ORDER — OMEPRAZOLE 20 MG/1
20 TABLET, DELAYED RELEASE ORAL DAILY
COMMUNITY

## 2024-11-24 RX ORDER — ACETAMINOPHEN 500 MG
1000 TABLET ORAL EVERY 4 HOURS PRN
Status: DISCONTINUED | OUTPATIENT
Start: 2024-11-24 | End: 2024-11-25 | Stop reason: HOSPADM

## 2024-11-24 RX ORDER — OLANZAPINE 5 MG/1
5 TABLET, ORALLY DISINTEGRATING ORAL ONCE
Status: COMPLETED | OUTPATIENT
Start: 2024-11-24 | End: 2024-11-24

## 2024-11-24 RX ADMIN — DIPHENHYDRAMINE HYDROCHLORIDE 25 MG: 50 INJECTION, SOLUTION INTRAMUSCULAR; INTRAVENOUS at 21:53

## 2024-11-24 RX ADMIN — KETOROLAC TROMETHAMINE 15 MG: 15 INJECTION, SOLUTION INTRAMUSCULAR; INTRAVENOUS at 18:03

## 2024-11-24 RX ADMIN — SODIUM CHLORIDE 1000 ML: 9 INJECTION, SOLUTION INTRAVENOUS at 18:00

## 2024-11-24 RX ADMIN — LORAZEPAM 1 MG: 2 INJECTION INTRAMUSCULAR; INTRAVENOUS at 21:52

## 2024-11-24 RX ADMIN — METOCLOPRAMIDE 10 MG: 5 INJECTION, SOLUTION INTRAMUSCULAR; INTRAVENOUS at 17:54

## 2024-11-24 RX ADMIN — OLANZAPINE 5 MG: 5 TABLET, ORALLY DISINTEGRATING ORAL at 20:33

## 2024-11-24 RX ADMIN — DEXAMETHASONE SODIUM PHOSPHATE 10 MG: 10 INJECTION, SOLUTION INTRAMUSCULAR; INTRAVENOUS at 17:54

## 2024-11-24 RX ADMIN — ACETAMINOPHEN 1000 MG: 500 TABLET, FILM COATED ORAL at 21:56

## 2024-11-24 ASSESSMENT — ACTIVITIES OF DAILY LIVING (ADL)
ADLS_ACUITY_SCORE: 0

## 2024-11-24 NOTE — ED PROVIDER NOTES
"  Emergency Department Note      History of Present Illness     Chief Complaint   Headache    HPI   Dot Lujan is a 49 year old female with a history of CVA, hypertension, and hyperlipidemia who presents to the ER for severe photosensitive headache. Patient reports getting her eyes dilated to check for cataracts earlier today and the headache starting 1-2 hours after the drops went in. She recalls that the drops burned while going in, her eyes dried out, and then felt hot until the headache started. She states that the headache feels like a \"hot poker\" and is bouncing back and forth between her eyes and the back of her skull. Dot explains that she is starting to feel congested, she has vomited twice, and that the headache feel similar to past migraines. Patient recalls that she has to get cataract surgery on both eyes.     Independent Historian   None    Review of External Notes   I reviewed the nurse triage call from today regarding patient's presentation to the ER.   I reviewed the note from 11/2/24 regarding prescription medication.  I reviewed the 9/10/24 note regarding patient's labs and past medical history.   I reviewed the immaging from 9/10/24.   Past Medical History     Medical History and Problem List   Allergic rhinitis  Anxiety  Asthma  Depressive disorder  Dyslipidemia  Fibromyalgia  GERD  Hypertension  Hyperlipidemia   Methamphetamine abuse  Migraine  Opioid abuse  CVA    Medications   Albuterol   Atorvastatin   Lisinopril   Methocarbamol   Olanzapine   Prednisone   Sumatriptan   Trazodone   Asprin 81 mg  Ferosul   Diphenhydramine   Ondansetron   Topiramate   Varenicline   Physical Exam     Patient Vitals for the past 24 hrs:   BP Temp Temp src Pulse Resp SpO2 Height Weight   11/24/24 2230 107/67 -- -- 72 -- 94 % -- --   11/24/24 2215 (!) 131/90 -- -- 84 -- -- -- --   11/24/24 2204 (!) 129/90 -- -- -- -- 95 % -- --   11/24/24 2149 108/64 -- -- -- -- 95 % -- --   11/24/24 2134 " "111/65 -- -- -- -- 95 % -- --   11/24/24 2119 105/61 -- -- -- -- 95 % -- --   11/24/24 2104 105/60 -- -- -- -- 95 % -- --   11/24/24 2049 104/63 -- -- -- -- 95 % -- --   11/24/24 2034 112/70 -- -- -- -- 96 % -- --   11/24/24 2019 102/63 -- -- -- -- 94 % -- --   11/24/24 2004 111/66 -- -- -- -- 93 % -- --   11/24/24 1949 98/57 -- -- -- -- 93 % -- --   11/24/24 1934 (!) 89/56 -- -- -- -- 94 % -- --   11/24/24 1919 97/56 -- -- -- -- 94 % -- --   11/24/24 1904 103/59 -- -- -- -- 94 % -- --   11/24/24 1849 107/73 -- -- -- -- 96 % -- --   11/24/24 1834 103/58 -- -- -- -- 97 % -- --   11/24/24 1808 -- -- -- 66 16 94 % -- --   11/24/24 1507 112/73 98  F (36.7  C) Oral 85 16 96 % 1.6 m (5' 3\") 79.7 kg (175 lb 11.3 oz)     Physical Exam  General: The patient is alert, in no respiratory distress. She is wearing sunglasses and appears uncomfortable.     HENT: Mucous membranes moist.    Cardiovascular: Regular rate and rhythm. Good pulses in all four extremities. Normal capillary refill and skin turgor.     Respiratory: Lungs are clear. No nasal flaring. No retractions. No wheezing, no crackles.    Musculoskeletal: No gross deformity.     Skin: No rashes or petechiae.     Neurologic: The patient is alert and oriented x3. GCS 15. No testable cranial nerve deficit. Follows commands with clear and appropriate speech. Gives appropriate answers. Good strength in all extremities. No gross neurologic deficit. Gross sensation intact. Pupils are round and reactive. No meningismus.     Lymphatic: No cervical adenopathy. No lower extremity swelling.    Psychiatric: The patient is non-tearful.     Diagnostics     Lab Results   Labs Ordered and Resulted from Time of ED Arrival to Time of ED Departure - No data to display    Imaging   No orders to display     Independent Interpretation   None    ED Course      Medications Administered   Medications   acetaminophen (TYLENOL) tablet 1,000 mg (1,000 mg Oral $Given 11/24/24 0732)   tetracaine " (PONTOCAINE) 0.5 % ophthalmic solution (has no administration in time range)   sodium chloride 0.9% BOLUS 1,000 mL (0 mLs Intravenous Stopped 11/24/24 2053)   metoclopramide (REGLAN) injection 10 mg (10 mg Intravenous $Given 11/24/24 1754)   dexAMETHasone PF (DECADRON) injection 10 mg (10 mg Intravenous $Given 11/24/24 1754)   ketorolac (TORADOL) injection 15 mg (15 mg Intravenous $Given 11/24/24 1803)   OLANZapine zydis (zyPREXA) ODT tab 5 mg (5 mg Oral $Given 11/24/24 2033)   LORazepam (ATIVAN) injection 1 mg (1 mg Intravenous $Given 11/24/24 2152)   diphenhydrAMINE (BENADRYL) injection 25 mg (25 mg Intravenous $Given 11/24/24 2153)         ED Course   ED Course as of 11/24/24 2300   Sun Nov 24, 2024   1647 I obtained the history and examined the patient as noted above.    2121 I rechecked patient and explained findings and plan of care. Headache is down to a 6/10 and she wants more medicine.   2210 I rechecked patient and explained findings and plan of care. Funduscopic exam that was normal. Intraocular pressure in right eye was 13. Bilateral ear infection.        Additional Documentation  None    Medical Decision Making / Diagnosis     CMS Diagnoses: None    MIPS       None    MDM   Dot Lujan is a 49 year old female because the patient's headache started several hours after being dilated I did consider this could be acute angle glaucoma however intraocular pressure was adequate funduscopic exam was normal I do not think this is a cavernous sinus thrombosis.  In fact she did not have great improvement with the antimigraine medication I think there could be some migraine component she in fact does have signs of bilateral otitis media which I think is a large component of this the patient was greatly improved after morphine she was able to eat take off her glasses I started her on antibiotics and she was discharged to outpatient follow-up symptoms to return for discussed I do not think this is an  intraocular process.  Meningitis encephalitis and bleed were considered however after discussion with the patient we will hold off on lumbar puncture.    Disposition   The patient was discharged.     Diagnosis     ICD-10-CM    1. Acute nonintractable headache, unspecified headache type  R51.9       2. Acute bilateral otitis media  H66.93            Discharge Medications   New Prescriptions    AMOXICILLIN (AMOXIL) 500 MG CAPSULE    Take 1 capsule (500 mg) by mouth 3 times daily for 10 days.         Scribe Disclosure:  I, Cosme Baum, am serving as a scribe at 5:28 PM on 11/24/2024 to document services personally performed by Charles Viveros MD based on my observations and the provider's statements to me.        Charles Viveros MD  11/24/24 2918

## 2024-11-24 NOTE — ED TRIAGE NOTES
Migraine triggered by having eyes dilated on Thursday. Migraine rescue meds ineffective. Sensitive to light and sound.

## 2024-11-25 NOTE — ED NOTES
Neuro CognitiveCognitive/Neuro/Behavioral WDL:  (pt comes in with migrai ne that was triggered after going to the eye dr on thursday and getting her eyes dialated pt reports pressure all over worse on the right, light sensitive, + nausea and vomiting)Additional Documentation: Headache Assessment (Group) (pt took tylenol at 3 am, imitrex this am and gabapentin this am and at noon)  Nancy Coma ScaleBest Eye Response: 4-->(E4) spontaneousBest Motor Response: 6-->(M6) obeys commandsBest Verbal Response: 5-->(V5) orientedGlasgow Coma Scale Score: 15  Headache AssessmentHeadache Location: generalizedDescription/Character: sharp; throbbingAssociated Signs/Symptoms: photophobia; vomiting; nausea; dizziness; weakness (left sided weakness with headaches)

## 2024-11-25 NOTE — DISCHARGE INSTRUCTIONS
Discharge Instructions  Headache    You were seen today for a headache. Headaches may be caused by many different things such as muscle tension, sinus inflammation, anxiety and stress, having too little sleep, too much alcohol, some medical conditions or injury. You may have a migraine, which is caused by changes in the blood vessels in your head.  At this time your doctor does not find that your headache is a sign of anything dangerous or life-threatening.  However, sometimes the signs of serious illness do not show up right away.  If you have new or worse symptoms, you may need to be seen again in the emergency department or by your primary doctor.      Return to the Emergency Department if:  You get a fever of 101 F or higher.  Your headache gets much worse.  You get a stiff neck with your headache.  You get a new headache that is different or worse than headaches you have had before.  You are vomiting and can t keep food or water down.  You have blurry or double vision or other problems with your eyes.  You have a new weakness on one side of your body.  You have difficulty with balance which is new.  You or your family thinks you are confused.  You have a seizure or convulsion.    What can I do to help myself?  Pain medications - You may take a pain medication such as Tylenol  (acetaminophen), Advil , Nuprin  (ibuprofen) or Aleve  (naproxen).  If you have been given a narcotic such as Vicodin  (hydrocodone with acetaminophen), Percocet  (oxycodone with acetaminophen), codeine, or a muscle relaxant such as Flexeril  (cyclobenzaprine) or Soma  (carisoprodol), do not drive for four hours after you have taken it. If the narcotic contains Tylenol  (acetaminophen), do not take Tylenol  with it. All narcotics will cause constipation, so eat a high fiber diet.      Take a pain reliever as soon as you notice symptoms.  Starting medications as soon as you start to have symptoms may lessen the amount of pain you  have.  Relaxing in a quiet, dark room may help.  Get enough sleep and eat meals regularly.  Schedule an appointment with your primary physician as instructed, or at least within 1 week.  You may need to watch for certain foods or other things which may trigger your headaches.  Keeping a journal of your headaches and possible triggers may help you and your primary doctor to identify things which you should avoid which may be causing your headaches.  If you were given a prescription for medicine here today, be sure to read all of the information (including the package insert) that comes with your prescription.  This will include important information about the medicine, its side effects, and any warnings that you need to know about.  The pharmacist who fills the prescription can provide more information and answer questions you may have about the medicine.  If you have questions or concerns that the pharmacist cannot address, please call or return to the Emergency Department.   Opioid Medication Information    Pain medications are among the most commonly prescribed medicines, so we are including this information for all our patients. If you did not receive pain medication or get a prescription for pain medicine, you can ignore it.     You may have been given a prescription for an opioid (narcotic) pain medicine and/or have received a pain medicine while here in the Emergency Department. These medicines can make you drowsy or impaired. You must not drive, operate dangerous equipment, or engage in any other dangerous activities while taking these medications. If you drive while taking these medications, you could be arrested for DUI, or driving under the influence. Do not drink any alcohol while you are taking these medications.     Opioid pain medications can cause addiction. If you have a history of chemical dependency of any type, you are at a higher risk of becoming addicted to pain medications.  Only take these  prescribed medications to treat your pain when all other options have been tried. Take it for as short a time and as few doses as possible. Store your pain pills in a secure place, as they are frequently stolen and provide a dangerous opportunity for children or visitors in your house to start abusing these powerful medications. We will not replace any lost or stolen medicine.  As soon as your pain is better, you should flush all your remaining medication.     Many prescription pain medications contain Tylenol  (acetaminophen), including Vicodin , Tylenol #3 , Norco , Lortab , and Percocet .  You should not take any extra pills of Tylenol  if you are using these prescription medications or you can get very sick.  Do not ever take more than 3000 mg of acetaminophen in any 24 hour period.    All opioids tend to cause constipation. Drink plenty of water and eat foods that have a lot of fiber, such as fruits, vegetables, prune juice, apple juice and high fiber cereal.  Take a laxative if you don t move your bowels at least every other day. Miralax , Milk of Magnesia, Colace , or Senna  can be used to keep you regular.      Remember that you can always come back to the Emergency Department if you are not able to see your regular doctor in the amount of time listed above, if you get any new symptoms, or if there is anything that worries you.      Discharge Instructions  Otitis Media  You or your child have an ear infection known as acute otitis media, or middle ear infection (otitis = ear, media = middle). These infections often develop after a virus infection, such as a cold. The cold causes swelling around the pressure-equalizing tube of the ear, which allows fluid to build up in the space behind the eardrum (the middle ear). This fluid build-up can trap bacteria and viruses and increase pressure on the eardrum causing pain.  Return to the Emergency Department if:  You or your child are not better within 24-48  "hours.  Your child becomes very fussy or weak.  Your child is showing signs of dehydration, such as less than 3 wet diapers per day.  Your symptoms get worse, or if you develop a severe headache, stiff neck, or new symptoms.  You have signs of allergic reaction to medicine. These include rash, lip swelling, difficulty breathing, wheezing, and dizziness.    Ear infection symptoms:   Symptoms of an ear infection can include ear aching or pain and temporary hearing loss. These symptoms often come on suddenly.    Ear infection symptoms (infants / young children):  Fever (temperature greater than 100.4 F or 38 C).  Pulling on the ear.  Fussiness.  Decreased activity.  Lack of appetite or difficulty eating.  Vomiting or diarrhea.    Treatment:   The \"best\" treatment depends on your age, history of previous infections, and any underlying medical problems.  Antibiotics are not given to every patient with an ear infection because studies show that many people with ear infections will improve without using antibiotics. Because antibiotics can have side effects such as diarrhea and stomach upset and can also cause severe allergic reactions, doctors are trying to avoid using antibiotics if it is safe for the patient to do so.   In these cases, a prescription for antibiotics may be given to be filled in 24 -48 hours if symptoms are getting worse or not improving. If the symptoms are improving, the antibiotic does not need to be taken.   Remember, antibiotics do not treat pain.    Pain medications. You may take a pain medication such as Tylenol  (acetaminophen), Advil  (ibuprofen), Nuprin  (ibuprofen) or Aleve  (naproxen).  If you have been given a narcotic such as Vicodin  (hydrocodone with acetaminophen), Percocet  (oxycodone with acetaminophen), or codeine, do not drive for four hours after you have taken it. If the narcotic contains Tylenol  (acetaminophen), do not take Tylenol  with it. All narcotics will cause constipation, " "so eat a high fiber diet.    Pain treatment options also include ear drops such as Auralgan  (antipyrine/benzocaine/u-polycosanol), which contains a topical numbing medicine.   Do not take a medication if you have a known allergy to that medication.  Probiotics: If you have been given an antibiotic, you may want to also take a probiotic pill or eat yogurt with live cultures. Probiotics have \"good bacteria\" to help your intestines stay healthy. Studies have shown that probiotics help prevent diarrhea and other intestine problems (including C. diff infection) when you take antibiotics. You can buy these without a prescription in the pharmacy section of the store.   Complications:    Tympanic membrane rupture - One possible complication of an ear infection is rupture of the tympanic membrane, or ear drum. This happens because of pressure on the tympanic membrane from the infected fluid. When the tympanic membrane ruptures, you may have pus or blood drain from the ear. It does not hurt when the membrane ruptures, and many people actually feel better because pressure is released. Fortunately, the tympanic membrane usually heals quickly after rupturing, within hours to days. You should keep water out of the ear until you re-check with your doctor to be sure the ear drum has healed.     Mastoiditis - Rarely, the area behind the ear can become infected, this area is called the mastoid.  If you notice redness and swelling behind your ear, see your physician or return to the Emergency Department immediately.      Hearing loss - The fluid that collects behind the eardrum (called an effusion) can persist for weeks to months after the pain of an ear infection resolves. An effusion causes trouble hearing, which is usually temporary. If the fluid persists, however, it can interfere with the process of learning to speak.   For this reason, children under 2 need to be seen by their pediatrician WITHIN 3 MONTHS to ensure that the " fluid has been reabsorbed.  If you were given a prescription for medicine here today, be sure to read all of the information (including the package insert) that comes with your prescription.  This will include important information about the medicine, its side effects, and any warnings that you need to know about.  The pharmacist who fills the prescription can provide more information and answer questions you may have about the medicine.  If you have questions or concerns that the pharmacist cannot address, please call or return to the Emergency Department.   Opioid Medication Information    Pain medications are among the most commonly prescribed medicines, so we are including this information for all our patients. If you did not receive pain medication or get a prescription for pain medicine, you can ignore it.     You may have been given a prescription for an opioid (narcotic) pain medicine and/or have received a pain medicine while here in the Emergency Department. These medicines can make you drowsy or impaired. You must not drive, operate dangerous equipment, or engage in any other dangerous activities while taking these medications. If you drive while taking these medications, you could be arrested for DUI, or driving under the influence. Do not drink any alcohol while you are taking these medications.     Opioid pain medications can cause addiction. If you have a history of chemical dependency of any type, you are at a higher risk of becoming addicted to pain medications.  Only take these prescribed medications to treat your pain when all other options have been tried. Take it for as short a time and as few doses as possible. Store your pain pills in a secure place, as they are frequently stolen and provide a dangerous opportunity for children or visitors in your house to start abusing these powerful medications. We will not replace any lost or stolen medicine.  As soon as your pain is better, you should  flush all your remaining medication.     Many prescription pain medications contain Tylenol  (acetaminophen), including Vicodin , Tylenol #3 , Norco , Lortab , and Percocet .  You should not take any extra pills of Tylenol  if you are using these prescription medications or you can get very sick.  Do not ever take more than 3000 mg of acetaminophen in any 24 hour period.    All opioids tend to cause constipation. Drink plenty of water and eat foods that have a lot of fiber, such as fruits, vegetables, prune juice, apple juice and high fiber cereal.  Take a laxative if you don t move your bowels at least every other day. Miralax , Milk of Magnesia, Colace , or Senna  can be used to keep you regular.      Remember that you can always come back to the Emergency Department if you are not able to see your regular doctor in the amount of time listed above, if you get any new symptoms, or if there is anything that worries you.

## 2025-01-06 ENCOUNTER — HOSPITAL ENCOUNTER (EMERGENCY)
Facility: CLINIC | Age: 50
Discharge: HOME | End: 2025-01-06
Attending: EMERGENCY MEDICINE
Payer: COMMERCIAL

## 2025-01-06 VITALS
RESPIRATION RATE: 18 BRPM | HEART RATE: 106 BPM | DIASTOLIC BLOOD PRESSURE: 82 MMHG | BODY MASS INDEX: 31.36 KG/M2 | WEIGHT: 177 LBS | HEIGHT: 63 IN | OXYGEN SATURATION: 97 % | TEMPERATURE: 98.7 F | SYSTOLIC BLOOD PRESSURE: 115 MMHG

## 2025-01-06 DIAGNOSIS — E86.0 DEHYDRATION: ICD-10-CM

## 2025-01-06 DIAGNOSIS — K52.9 ACUTE GASTROENTERITIS: ICD-10-CM

## 2025-01-06 LAB
ALBUMIN SERPL BCG-MCNC: 4.4 G/DL (ref 3.5–5.2)
ALP SERPL-CCNC: 132 U/L (ref 40–150)
ALT SERPL W P-5'-P-CCNC: 35 U/L (ref 0–50)
ANION GAP SERPL CALCULATED.3IONS-SCNC: 18 MMOL/L (ref 7–15)
AST SERPL W P-5'-P-CCNC: 29 U/L (ref 0–45)
BASOPHILS # BLD AUTO: 0 10E3/UL (ref 0–0.2)
BASOPHILS NFR BLD AUTO: 0 %
BILIRUB SERPL-MCNC: 0.3 MG/DL
BUN SERPL-MCNC: 12.7 MG/DL (ref 6–20)
CALCIUM SERPL-MCNC: 8.6 MG/DL (ref 8.8–10.4)
CHLORIDE SERPL-SCNC: 108 MMOL/L (ref 98–107)
CREAT SERPL-MCNC: 0.88 MG/DL (ref 0.51–0.95)
EGFRCR SERPLBLD CKD-EPI 2021: 81 ML/MIN/1.73M2
EOSINOPHIL # BLD AUTO: 0 10E3/UL (ref 0–0.7)
EOSINOPHIL NFR BLD AUTO: 0 %
ERYTHROCYTE [DISTWIDTH] IN BLOOD BY AUTOMATED COUNT: 14.3 % (ref 10–15)
GLUCOSE SERPL-MCNC: 158 MG/DL (ref 70–99)
HCO3 SERPL-SCNC: 14 MMOL/L (ref 22–29)
HCT VFR BLD AUTO: 41.9 % (ref 35–47)
HGB BLD-MCNC: 13.5 G/DL (ref 11.7–15.7)
HOLD SPECIMEN: NORMAL
HOLD SPECIMEN: NORMAL
IMM GRANULOCYTES # BLD: 0.1 10E3/UL
IMM GRANULOCYTES NFR BLD: 0 %
LYMPHOCYTES # BLD AUTO: 0.3 10E3/UL (ref 0.8–5.3)
LYMPHOCYTES NFR BLD AUTO: 3 %
MCH RBC QN AUTO: 27.3 PG (ref 26.5–33)
MCHC RBC AUTO-ENTMCNC: 32.2 G/DL (ref 31.5–36.5)
MCV RBC AUTO: 85 FL (ref 78–100)
MONOCYTES # BLD AUTO: 0.5 10E3/UL (ref 0–1.3)
MONOCYTES NFR BLD AUTO: 4 %
NEUTROPHILS # BLD AUTO: 11.8 10E3/UL (ref 1.6–8.3)
NEUTROPHILS NFR BLD AUTO: 93 %
NRBC # BLD AUTO: 0 10E3/UL
NRBC BLD AUTO-RTO: 0 /100
PLATELET # BLD AUTO: 271 10E3/UL (ref 150–450)
POTASSIUM SERPL-SCNC: 3.9 MMOL/L (ref 3.4–5.3)
PROT SERPL-MCNC: 7.2 G/DL (ref 6.4–8.3)
RBC # BLD AUTO: 4.95 10E6/UL (ref 3.8–5.2)
SODIUM SERPL-SCNC: 140 MMOL/L (ref 135–145)
WBC # BLD AUTO: 12.6 10E3/UL (ref 4–11)

## 2025-01-06 PROCEDURE — 96361 HYDRATE IV INFUSION ADD-ON: CPT

## 2025-01-06 PROCEDURE — 250N000011 HC RX IP 250 OP 636: Performed by: EMERGENCY MEDICINE

## 2025-01-06 PROCEDURE — 36415 COLL VENOUS BLD VENIPUNCTURE: CPT | Performed by: EMERGENCY MEDICINE

## 2025-01-06 PROCEDURE — 82435 ASSAY OF BLOOD CHLORIDE: CPT | Performed by: EMERGENCY MEDICINE

## 2025-01-06 PROCEDURE — 96376 TX/PRO/DX INJ SAME DRUG ADON: CPT

## 2025-01-06 PROCEDURE — 85004 AUTOMATED DIFF WBC COUNT: CPT | Performed by: EMERGENCY MEDICINE

## 2025-01-06 PROCEDURE — 87507 IADNA-DNA/RNA PROBE TQ 12-25: CPT | Performed by: EMERGENCY MEDICINE

## 2025-01-06 PROCEDURE — 250N000013 HC RX MED GY IP 250 OP 250 PS 637: Performed by: EMERGENCY MEDICINE

## 2025-01-06 PROCEDURE — 99284 EMERGENCY DEPT VISIT MOD MDM: CPT | Mod: 25

## 2025-01-06 PROCEDURE — 258N000003 HC RX IP 258 OP 636: Performed by: EMERGENCY MEDICINE

## 2025-01-06 PROCEDURE — 84155 ASSAY OF PROTEIN SERUM: CPT | Performed by: EMERGENCY MEDICINE

## 2025-01-06 PROCEDURE — 96374 THER/PROPH/DIAG INJ IV PUSH: CPT

## 2025-01-06 PROCEDURE — 85014 HEMATOCRIT: CPT | Performed by: EMERGENCY MEDICINE

## 2025-01-06 PROCEDURE — 96375 TX/PRO/DX INJ NEW DRUG ADDON: CPT

## 2025-01-06 RX ORDER — ACETAMINOPHEN 500 MG
1000 TABLET ORAL EVERY 4 HOURS PRN
Status: DISCONTINUED | OUTPATIENT
Start: 2025-01-06 | End: 2025-01-06

## 2025-01-06 RX ORDER — KETOROLAC TROMETHAMINE 15 MG/ML
10 INJECTION, SOLUTION INTRAMUSCULAR; INTRAVENOUS ONCE
Status: COMPLETED | OUTPATIENT
Start: 2025-01-06 | End: 2025-01-06

## 2025-01-06 RX ORDER — ONDANSETRON 2 MG/ML
4 INJECTION INTRAMUSCULAR; INTRAVENOUS EVERY 30 MIN PRN
Status: DISCONTINUED | OUTPATIENT
Start: 2025-01-06 | End: 2025-01-06 | Stop reason: HOSPADM

## 2025-01-06 RX ORDER — ACETAMINOPHEN 500 MG
1000 TABLET ORAL ONCE
Status: COMPLETED | OUTPATIENT
Start: 2025-01-06 | End: 2025-01-06

## 2025-01-06 RX ORDER — ONDANSETRON 4 MG/1
4 TABLET, ORALLY DISINTEGRATING ORAL EVERY 6 HOURS PRN
Qty: 10 TABLET | Refills: 0 | Status: SHIPPED | OUTPATIENT
Start: 2025-01-06 | End: 2025-01-09

## 2025-01-06 RX ORDER — ONDANSETRON 4 MG/1
4 TABLET, ORALLY DISINTEGRATING ORAL ONCE
Status: COMPLETED | OUTPATIENT
Start: 2025-01-06 | End: 2025-01-06

## 2025-01-06 RX ADMIN — SODIUM CHLORIDE 1000 ML: 9 INJECTION, SOLUTION INTRAVENOUS at 14:57

## 2025-01-06 RX ADMIN — ONDANSETRON 4 MG: 4 TABLET, ORALLY DISINTEGRATING ORAL at 11:06

## 2025-01-06 RX ADMIN — KETOROLAC TROMETHAMINE 10 MG: 15 INJECTION, SOLUTION INTRAMUSCULAR; INTRAVENOUS at 17:19

## 2025-01-06 RX ADMIN — ONDANSETRON 4 MG: 2 INJECTION INTRAMUSCULAR; INTRAVENOUS at 17:17

## 2025-01-06 RX ADMIN — ONDANSETRON 4 MG: 2 INJECTION INTRAMUSCULAR; INTRAVENOUS at 14:57

## 2025-01-06 RX ADMIN — SODIUM CHLORIDE 1000 ML: 9 INJECTION, SOLUTION INTRAVENOUS at 17:37

## 2025-01-06 RX ADMIN — ACETAMINOPHEN 1000 MG: 500 TABLET, FILM COATED ORAL at 17:17

## 2025-01-06 ASSESSMENT — ACTIVITIES OF DAILY LIVING (ADL)
ADLS_ACUITY_SCORE: 41

## 2025-01-06 ASSESSMENT — COLUMBIA-SUICIDE SEVERITY RATING SCALE - C-SSRS
2. HAVE YOU ACTUALLY HAD ANY THOUGHTS OF KILLING YOURSELF IN THE PAST MONTH?: NO
6. HAVE YOU EVER DONE ANYTHING, STARTED TO DO ANYTHING, OR PREPARED TO DO ANYTHING TO END YOUR LIFE?: NO
1. IN THE PAST MONTH, HAVE YOU WISHED YOU WERE DEAD OR WISHED YOU COULD GO TO SLEEP AND NOT WAKE UP?: NO

## 2025-01-06 NOTE — ED PROVIDER NOTES
"  Emergency Department Note      History of Present Illness     Chief Complaint   Nausea, Vomiting, & Diarrhea      HPI   Dot Lujan is a 49 year old female with history of IBS, hypertension, hyperlipidemia, CVA, chronic migraine, and opioid abuse who presents to the ED for evaluation of nausea, vomiting, and diarrhea. Patient presents with nausea, vomiting, and diarrhea since 0100 this morning. She visited her mother's nursing facility which has a known Norovirus outbreak and she was not wearing a mask. She reports a small episode of emesis this morning. Notes the diarrhea is now yellow and \"mucousy\" in appearance. Denies fevers at home. She is nauseous now in the ED.           Independent Historian   None    Review of External Notes   none    Past Medical History     Medical History and Problem List   Allergic rhinitis  AG  Asthma  Major depressive disorder  Hyperlipidemia   Fibromyalgia  GERD  Hypertension  Methamphetamine abuse  Migraine  Opioid abuse  Bilateral occipital neuralgia  Chronic migraine  Tobacco dependence   CVA  Vitamin D deficiency     Medications   Atorvastatin  Dicyclomine   Ferrous sulfate  Fluoxetine  Gabapentin  Lisinopril  Olanzapine  Sumatriptan  Trazadone   Topiramate   Rimegepant     Surgical History   Appendectomy     Ovarian cyst removal     Physical Exam     Patient Vitals for the past 24 hrs:   BP Temp Temp src Pulse Resp SpO2 Height Weight   25 1801 -- -- -- 107 -- -- -- --   25 1720 128/83 -- -- 116 -- 95 % -- --   25 1101 (!) 133/94 98.7  F (37.1  C) Oral (!) 124 22 96 % 1.6 m (5' 3\") 80.3 kg (177 lb)     Physical Exam  Constitutional:       Appearance: She is well-developed.   HENT:      Right Ear: Tympanic membrane and external ear normal.      Left Ear: Tympanic membrane and external ear normal.      Mouth/Throat:      Mouth: Mucous membranes are dry.      Pharynx: Oropharynx is clear. No oropharyngeal exudate or posterior " oropharyngeal erythema.   Eyes:      General: No scleral icterus.     Extraocular Movements: Extraocular movements intact.      Conjunctiva/sclera: Conjunctivae normal.      Pupils: Pupils are equal, round, and reactive to light.   Cardiovascular:      Rate and Rhythm: Regular rhythm. Tachycardia present.      Heart sounds: Normal heart sounds. No murmur heard.     No friction rub. No gallop.   Pulmonary:      Effort: Pulmonary effort is normal. No respiratory distress.      Breath sounds: Normal breath sounds. No stridor. No wheezing, rhonchi or rales.   Abdominal:      General: Bowel sounds are normal. There is no distension.      Palpations: Abdomen is soft. There is no mass.      Tenderness: There is abdominal tenderness. There is no right CVA tenderness or left CVA tenderness.      Comments: Mild epigastric TTP   Musculoskeletal:         General: Normal range of motion.   Skin:     General: Skin is warm and dry.      Capillary Refill: Capillary refill takes less than 2 seconds.      Findings: No rash.   Neurological:      Mental Status: She is alert.           Diagnostics     Lab Results   Labs Ordered and Resulted from Time of ED Arrival to Time of ED Departure   COMPREHENSIVE METABOLIC PANEL - Abnormal       Result Value    Sodium 140      Potassium 3.9      Carbon Dioxide (CO2) 14 (*)     Anion Gap 18 (*)     Urea Nitrogen 12.7      Creatinine 0.88      GFR Estimate 81      Calcium 8.6 (*)     Chloride 108 (*)     Glucose 158 (*)     Alkaline Phosphatase 132      AST 29      ALT 35      Protein Total 7.2      Albumin 4.4      Bilirubin Total 0.3     CBC WITH PLATELETS AND DIFFERENTIAL - Abnormal    WBC Count 12.6 (*)     RBC Count 4.95      Hemoglobin 13.5      Hematocrit 41.9      MCV 85      MCH 27.3      MCHC 32.2      RDW 14.3      Platelet Count 271      % Neutrophils 93      % Lymphocytes 3      % Monocytes 4      % Eosinophils 0      % Basophils 0      % Immature Granulocytes 0      NRBCs per 100 WBC  0      Absolute Neutrophils 11.8 (*)     Absolute Lymphocytes 0.3 (*)     Absolute Monocytes 0.5      Absolute Eosinophils 0.0      Absolute Basophils 0.0      Absolute Immature Granulocytes 0.1      Absolute NRBCs 0.0     ENTERIC BACTERIA AND VIRUS PANEL BY PCR       Imaging   No orders to display     Independent Interpretation   None    ED Course      Medications Administered   Medications   ondansetron (ZOFRAN) injection 4 mg (4 mg Intravenous $Given 1/6/25 1717)   ondansetron (ZOFRAN ODT) ODT tab 4 mg (4 mg Oral $Given 1/6/25 1106)   sodium chloride 0.9% BOLUS 1,000 mL (0 mLs Intravenous Stopped 1/6/25 1625)   acetaminophen (TYLENOL) tablet 1,000 mg (1,000 mg Oral $Given 1/6/25 1717)   ketorolac (TORADOL) injection 10 mg (10 mg Intravenous $Given 1/6/25 1719)   sodium chloride 0.9% BOLUS 1,000 mL (0 mLs Intravenous Stopped 1/6/25 1830)       Procedures   Procedures     Discussion of Management   None    ED Course   ED Course as of 01/06/25 1831 Mon Jan 06, 2025   1346 I obtained history and examined the patient as noted above.        Additional Documentation  None    Medical Decision Making / Diagnosis     CMS Diagnoses: None    MIPS       None    MDM   Dot Lujan is a 49 year old female who presents via EMS for nausea and vomiting and diarrhea.  She was initially tachycardic but appeared dehydrated.  IV fluids given x 2.  Heart rate is improving after second liter of fluids.  She tolerated p.o. challenge.  She is feeling better.  Zofran will be prescribed for continued management home.  She is instructed that this is likely a viral illness and will improve on its own.  She needs to stay hydrated with clear liquids.  Return precaution provided.  She should recheck with her doctor next several days as needed. Questions answered.    Disposition   The patient was discharged.     Diagnosis     ICD-10-CM    1. Acute gastroenteritis  K52.9       2. Dehydration  E86.0            Discharge Medications    New Prescriptions    ONDANSETRON (ZOFRAN ODT) 4 MG ODT TAB    Take 1 tablet (4 mg) by mouth every 6 hours as needed for nausea or vomiting.         Scribe Disclosure:  I, Gabby Etienne, am serving as a scribe at 1:52 PM on 1/6/2025 to document services personally performed by Augustin Pastor MD based on my observations and the provider's statements to me.        Augustin Pastor MD  01/06/25 1969

## 2025-01-06 NOTE — ED TRIAGE NOTES
Pt here for n/v/d since 0100. Recently visited mom in nursing facility with a norovirus outbreak.

## 2025-01-07 ENCOUNTER — TELEPHONE (OUTPATIENT)
Dept: NURSING | Facility: CLINIC | Age: 50
End: 2025-01-07
Payer: COMMERCIAL

## 2025-01-07 LAB

## 2025-01-07 NOTE — LETTER
January 7, 2025        Dot Lujan  [id number] [place] [address]          Dear Dot Lujan:    You were seen in the [facility] Emergency Department at [facility] on 1/6/2025.  We are unable to reach you by phone, so we are sending you this letter.     It is important that you call [facility] Emergency Department lab result nurse at [phone], as we have information to relay to you AND/OR we MAY have to make some changes in your treatment.    Best time to call back is between 9AM and 5:30PM, 7 days a week.      Sincerely,     [facility] Emergency Department Lab Result RN  [phone]

## 2025-01-07 NOTE — DISCHARGE INSTRUCTIONS
Start with clear liquids. Push fluids. Rest  Zofran for  nausea as needed  Return for worsening symptoms

## 2025-01-07 NOTE — TELEPHONE ENCOUNTER
[facility]    Reason for call: Lab Result Notification     Lab Result (including Rx patient on, if applicable).  If culture, copy of lab report at bottom.  Lab Result:   Component      Latest Ref Rng 1/6/2025  4:12 PM   Norovirus Gl/Gll      Negative  Positive !       Legend:  ! Abnormal    Creatinine Level (mg/dl)   Creatinine   Date Value Ref Range Status   01/06/2025 0.88 0.51 - 0.95 mg/dL Final    Creatinine clearance (ml/min), if applicable    Serum creatinine: 0.88 mg/dL 01/06/25 1711  Estimated creatinine clearance: 78.5 mL/min     ED Symptoms: Presented to the ED with nausea, vomiting, and diarrhea.     Current Symptoms: Unable to assess.     RN Recommendations/Instructions per [place] ED lab result protocol:   [facility] ED lab result protocol utilized: Enteric bacteria (Norovirus)    Unable to reach patient/caregiver.     Left voicemail message requesting a call back to [phone] between 9 a.m. and 5:30 p.m. for patient's ED/UC lab results.    Letter pended to be sent via USPS mail.     NICCI RENTERIA RN

## 2025-01-27 ENCOUNTER — HOSPITAL ENCOUNTER (EMERGENCY)
Facility: CLINIC | Age: 50
Discharge: HOME | End: 2025-01-28
Attending: EMERGENCY MEDICINE | Admitting: EMERGENCY MEDICINE
Payer: COMMERCIAL

## 2025-01-27 DIAGNOSIS — G43.809 OTHER MIGRAINE WITHOUT STATUS MIGRAINOSUS, NOT INTRACTABLE: ICD-10-CM

## 2025-01-27 PROCEDURE — 99284 EMERGENCY DEPT VISIT MOD MDM: CPT | Mod: 25

## 2025-01-27 RX ORDER — METOCLOPRAMIDE HYDROCHLORIDE 5 MG/ML
10 INJECTION INTRAMUSCULAR; INTRAVENOUS ONCE
Status: COMPLETED | OUTPATIENT
Start: 2025-01-27 | End: 2025-01-28

## 2025-01-27 RX ORDER — KETOROLAC TROMETHAMINE 15 MG/ML
15 INJECTION, SOLUTION INTRAMUSCULAR; INTRAVENOUS ONCE
Status: COMPLETED | OUTPATIENT
Start: 2025-01-27 | End: 2025-01-28

## 2025-01-27 RX ORDER — DEXAMETHASONE SODIUM PHOSPHATE 10 MG/ML
10 INJECTION, SOLUTION INTRAMUSCULAR; INTRAVENOUS ONCE
Status: COMPLETED | OUTPATIENT
Start: 2025-01-27 | End: 2025-01-28

## 2025-01-27 RX ORDER — DIPHENHYDRAMINE HYDROCHLORIDE 50 MG/ML
25 INJECTION INTRAMUSCULAR; INTRAVENOUS ONCE
Status: COMPLETED | OUTPATIENT
Start: 2025-01-27 | End: 2025-01-28

## 2025-01-27 ASSESSMENT — ACTIVITIES OF DAILY LIVING (ADL): ADLS_ACUITY_SCORE: 41

## 2025-01-28 VITALS
HEART RATE: 76 BPM | SYSTOLIC BLOOD PRESSURE: 110 MMHG | DIASTOLIC BLOOD PRESSURE: 67 MMHG | TEMPERATURE: 98.1 F | OXYGEN SATURATION: 93 % | RESPIRATION RATE: 16 BRPM

## 2025-01-28 PROCEDURE — 96374 THER/PROPH/DIAG INJ IV PUSH: CPT | Mod: 59

## 2025-01-28 PROCEDURE — 96375 TX/PRO/DX INJ NEW DRUG ADDON: CPT

## 2025-01-28 PROCEDURE — 96361 HYDRATE IV INFUSION ADD-ON: CPT

## 2025-01-28 PROCEDURE — 250N000011 HC RX IP 250 OP 636: Performed by: EMERGENCY MEDICINE

## 2025-01-28 PROCEDURE — 258N000003 HC RX IP 258 OP 636: Performed by: EMERGENCY MEDICINE

## 2025-01-28 PROCEDURE — 250N000013 HC RX MED GY IP 250 OP 250 PS 637: Performed by: EMERGENCY MEDICINE

## 2025-01-28 RX ORDER — OLANZAPINE 5 MG/1
10 TABLET, ORALLY DISINTEGRATING ORAL AT BEDTIME
Status: DISCONTINUED | OUTPATIENT
Start: 2025-01-28 | End: 2025-01-28 | Stop reason: HOSPADM

## 2025-01-28 RX ADMIN — DIPHENHYDRAMINE HYDROCHLORIDE 25 MG: 50 INJECTION, SOLUTION INTRAMUSCULAR; INTRAVENOUS at 00:12

## 2025-01-28 RX ADMIN — KETOROLAC TROMETHAMINE 15 MG: 15 INJECTION, SOLUTION INTRAMUSCULAR; INTRAVENOUS at 00:12

## 2025-01-28 RX ADMIN — METOCLOPRAMIDE HYDROCHLORIDE 10 MG: 5 INJECTION INTRAMUSCULAR; INTRAVENOUS at 00:11

## 2025-01-28 RX ADMIN — OLANZAPINE 10 MG: 5 TABLET, ORALLY DISINTEGRATING ORAL at 01:23

## 2025-01-28 RX ADMIN — DEXAMETHASONE SODIUM PHOSPHATE 10 MG: 10 INJECTION, SOLUTION INTRAMUSCULAR; INTRAVENOUS at 00:12

## 2025-01-28 RX ADMIN — SODIUM CHLORIDE 1000 ML: 9 INJECTION, SOLUTION INTRAVENOUS at 00:12

## 2025-01-28 ASSESSMENT — ACTIVITIES OF DAILY LIVING (ADL): ADLS_ACUITY_SCORE: 41

## 2025-01-28 NOTE — ED TRIAGE NOTES
Pt here for headache that started 3-4 days ago. Pt has been feeling nauseated. Has not thrown up. Pt has hx of headaches. States this feels like a typical headache for her. Pt does not have migraine meds to take because her insurance is denying coverage of the medication.

## 2025-01-28 NOTE — ED PROVIDER NOTES
Emergency Department Note      History of Present Illness     Chief Complaint  No chief complaint on file.    HPI  Dot Lujan is a 50 year old female with history of AG, asthma, hypertension, hyperlipidemia, and migraines who presents to the ED for evaluation of headache. She reports a headache for the past 3-4 days along with nausea, sound sensitivity, light sensitivity, blurry vision, and double vision. This feels like her typical headaches. She endorses left sided weakness which is normal for her. Patient took Tylenol with no relief.     Independent Historian  None    Review of External Notes  I reviewed her ED visit from  for similar symptoms.  Past Medical History   Medical History and Problem List   Allergic rhinitis  AG  Asthma  Major depressive disorder  Hyperlipidemia   Fibromyalgia  GERD  Hypertension  Methamphetamine abuse  Migraine  Opioid abuse  Bilateral occipital neuralgia  Chronic migraine  Tobacco dependence   CVA  Vitamin D deficiency     Medications   Lipitor  Bentyl  Prozac  Gabapentin  Lisinopril  Robaxin  Zyprexa   Prilosec  Prednisone  Imitrex  Trazodone     Surgical History   Appendectomy     Ovarian cyst removal   Physical Exam   Patient Vitals for the past 24 hrs:   BP Temp Temp src Pulse Resp SpO2   25 0130 110/67 -- -- 76 -- 93 %   25 1812 115/79 98.1  F (36.7  C) Oral 90 16 96 %     Physical Exam  Constitutional: Vital signs reviewed as above  General: Alert, pleasant  HEENT: Moist mucous membranes  Eyes: Pupils are equal, round, and reactive to light.   Neck: Normal range of motion  Cardiovascular: normal rate, Regular rhythm and normal heart sounds.  Mo MRG  Pulmonary/Chest: Effort normal and breath sounds normal. No respiratory distress. Patient has no wheezes. Patient has no rales.   Gastrointestinal: Soft. Positive bowel sounds. No MRG.  Musculoskeletal/Extremities: Full ROM.  Endo: No pitting edema  Neurological: A/O x 3.  CN-II-XII intact bilaterally. No pronator drift. Normal strength and sensation throughout all 4 extremities. No neck stiffness.   Skin: Skin is warm and dry.   Psychiatric: Pleasant  Diagnostics     ED Course    Medications Administered  Medications   OLANZapine zydis (zyPREXA) ODT tab 10 mg (10 mg Oral $Given 1/28/25 0123)   metoclopramide (REGLAN) injection 10 mg (10 mg Intravenous $Given 1/28/25 0011)   diphenhydrAMINE (BENADRYL) injection 25 mg (25 mg Intravenous $Given 1/28/25 0012)   ketorolac (TORADOL) injection 15 mg (15 mg Intravenous $Given 1/28/25 0012)   dexAMETHasone PF (DECADRON) injection 10 mg (10 mg Intravenous $Given 1/28/25 0012)   sodium chloride 0.9% BOLUS 1,000 mL (1,000 mLs Intravenous $New Bag 1/28/25 0012)     Procedures  Procedures     Discussion of Management  None    Additional Documentation  None    ED Course  ED Course as of 01/28/25 0142   Mon Jan 27, 2025   2311 I obtained history and examined the patient as noted above.    Tue Jan 28, 2025   0056 I rechecked the patient and explained findings.    0134 I rechecked the patient and explained findings. I prepared the patient to be discharged home.      Medical Decision Making / Diagnosis   CMS Diagnoses: None    MIPS     None    [facility]  Dot Lujan is a 50 year old female who presents with headache.  This is similar to previous headaches.  She was seen here in November for the same thing and had a cocktail which she says works.  She does have some allergies to medications.  Looking back on that visit and November I gave her the Zofran, Toradol, Reglan, Decadron, Benadryl and Zyprexa.  She also received a liter of fluids.  She was feeling much better after some time here.  She was sleeping comfortably.  Again there are no red flags about this headache.  Symptoms are completely consistent with previous migraines.  There is no concern for stroke and there was no trauma.  No imaging is felt necessary.  Patient is comfortable  going back to her facility will be discharged.    Disposition  The patient was discharged.     ICD-10 Codes:    ICD-10-CM    1. Other migraine without status migrainosus, not intractable  G43.809            Discharge Medications  New Prescriptions    No medications on file     Scribe Disclosure:  I, Angelia Araya, am serving as a scribe at 11:17 PM on 1/27/2025 to document services personally performed by Bryan Downs MD based on my observations and the provider's statements to me.      Bryan Downs MD  01/28/25 0142

## 2025-03-05 ENCOUNTER — HOSPITAL ENCOUNTER (EMERGENCY)
Facility: CLINIC | Age: 50
Discharge: HOME | End: 2025-03-05
Attending: EMERGENCY MEDICINE | Admitting: EMERGENCY MEDICINE
Payer: COMMERCIAL

## 2025-03-05 VITALS
RESPIRATION RATE: 16 BRPM | HEART RATE: 72 BPM | DIASTOLIC BLOOD PRESSURE: 82 MMHG | TEMPERATURE: 97.9 F | SYSTOLIC BLOOD PRESSURE: 116 MMHG | OXYGEN SATURATION: 97 %

## 2025-03-05 DIAGNOSIS — R51.9 ACUTE INTRACTABLE HEADACHE, UNSPECIFIED HEADACHE TYPE: ICD-10-CM

## 2025-03-05 PROCEDURE — 96375 TX/PRO/DX INJ NEW DRUG ADDON: CPT

## 2025-03-05 PROCEDURE — 99285 EMERGENCY DEPT VISIT HI MDM: CPT | Mod: 25

## 2025-03-05 PROCEDURE — 250N000011 HC RX IP 250 OP 636: Performed by: EMERGENCY MEDICINE

## 2025-03-05 PROCEDURE — 250N000013 HC RX MED GY IP 250 OP 250 PS 637: Performed by: EMERGENCY MEDICINE

## 2025-03-05 PROCEDURE — 96374 THER/PROPH/DIAG INJ IV PUSH: CPT

## 2025-03-05 RX ORDER — DEXAMETHASONE SODIUM PHOSPHATE 10 MG/ML
10 INJECTION, SOLUTION INTRAMUSCULAR; INTRAVENOUS ONCE
Status: COMPLETED | OUTPATIENT
Start: 2025-03-05 | End: 2025-03-05

## 2025-03-05 RX ORDER — METOCLOPRAMIDE HYDROCHLORIDE 5 MG/ML
10 INJECTION INTRAMUSCULAR; INTRAVENOUS ONCE
Status: COMPLETED | OUTPATIENT
Start: 2025-03-05 | End: 2025-03-05

## 2025-03-05 RX ORDER — DIPHENHYDRAMINE HYDROCHLORIDE 50 MG/ML
12.5 INJECTION, SOLUTION INTRAMUSCULAR; INTRAVENOUS ONCE
Status: COMPLETED | OUTPATIENT
Start: 2025-03-05 | End: 2025-03-05

## 2025-03-05 RX ORDER — ACETAMINOPHEN 500 MG
1000 TABLET ORAL ONCE
Status: COMPLETED | OUTPATIENT
Start: 2025-03-05 | End: 2025-03-05

## 2025-03-05 RX ORDER — KETOROLAC TROMETHAMINE 15 MG/ML
15 INJECTION, SOLUTION INTRAMUSCULAR; INTRAVENOUS ONCE
Status: COMPLETED | OUTPATIENT
Start: 2025-03-05 | End: 2025-03-05

## 2025-03-05 RX ORDER — OXYCODONE HYDROCHLORIDE 5 MG/1
5 TABLET ORAL ONCE
Status: COMPLETED | OUTPATIENT
Start: 2025-03-05 | End: 2025-03-05

## 2025-03-05 RX ADMIN — DIPHENHYDRAMINE HYDROCHLORIDE 12.5 MG: 50 INJECTION, SOLUTION INTRAMUSCULAR; INTRAVENOUS at 15:57

## 2025-03-05 RX ADMIN — KETOROLAC TROMETHAMINE 15 MG: 15 INJECTION, SOLUTION INTRAMUSCULAR; INTRAVENOUS at 15:57

## 2025-03-05 RX ADMIN — ACETAMINOPHEN 1000 MG: 500 TABLET, FILM COATED ORAL at 15:58

## 2025-03-05 RX ADMIN — OXYCODONE HYDROCHLORIDE 5 MG: 5 TABLET ORAL at 17:52

## 2025-03-05 RX ADMIN — DEXAMETHASONE SODIUM PHOSPHATE 10 MG: 10 INJECTION, SOLUTION INTRAMUSCULAR; INTRAVENOUS at 17:51

## 2025-03-05 RX ADMIN — METOCLOPRAMIDE 10 MG: 5 INJECTION, SOLUTION INTRAMUSCULAR; INTRAVENOUS at 15:58

## 2025-03-05 ASSESSMENT — ACTIVITIES OF DAILY LIVING (ADL)
ADLS_ACUITY_SCORE: 41

## 2025-03-05 NOTE — ED TRIAGE NOTES
Patient c/o headache since Monday. States she had her eyes dilated on Monday and headache has persisted since them. Attempted over the counter meds with no relief. Having nausea, vomiting and light sensitivity. ABCs intact. VSS.

## 2025-03-05 NOTE — ED PROVIDER NOTES
Emergency Department Note      History of Present Illness     Chief Complaint   Headache      HPI   Dot Lujan is a 50 year old female with a past medical history significant for CVA, migraines, polysubstance abuse, hyperlipidemia and hypertension here for evaluation of headache. Patient reports three days of a constant migraine since getting her eyes dilated at an ophthalmologist visit. She has been taking an unspecified eye medication and tylenol without relief, last dose was last night. She reports daily headaches and left sided weakness since her stroke when she was in her 20s. She states that today's presentation is similar to her typical migraines, however today today her eyes more painful and she reports some blurry vision. Bright lights exacerbate the pain. She denies nausea, vomiting, chest pain and shortness of breath.    Independent Historian   None    Review of External Notes   I reviewed the note from 25     Past Medical History     Medical History and Problem List   AG  Asthma  Major depressive disorder  Hyperlipidemia   Fibromyalgia  GERD  Hypertension  Methamphetamine abuse  Migraine  Opioid abuse  Bilateral occipital neuralgia  Chronic migraine  Tobacco dependence   CVA, residual left sided weakness   IBS   Deafness in left ear     Medications   Imitrex   Lipitor  Bentyl   Ferosul   Prozac   Gabapentin   Lisinopril   Trazodone   Omeprazole     Surgical History   Appendectomy     Ovarian cyst removal     Physical Exam     Patient Vitals for the past 24 hrs:   BP Temp Temp src Pulse Resp SpO2   25 116/82 -- -- 72 -- 97 %   25 1606 116/84 -- -- 74 16 96 %   25 1430 113/77 97.9  F (36.6  C) Temporal 80 20 94 %     Physical Exam  General: No acute distress  Head: No obvious trauma to head.  Ears, Nose, Throat:  External ears normal.  Nose normal.  No pharyngeal erythema, swelling or exudate.  Midline uvula. Moist mucus membranes.  Eyes:   Conjunctivae clear. EOMI. PERRL. No nystagmus   Neck: Normal range of motion.  Neck supple.   CV: Regular rate and rhythm.  No murmurs.      Respiratory: Effort normal and breath sounds normal.  No wheezing or crackles.   Gastrointestinal: Soft.  No distension. There is no tenderness.  There is no rigidity, no rebound and no guarding.   Musculoskeletal: Normal range of motion.  Non tender extremities to palpations. No lower extremity edema  Neuro: Alert. Moving all extremities appropriately.  Normal speech. CN II-XII grossly intact, no pronator drift, normal finger-nose-finger, visual fields intact.  Gross muscle strength intact of the proximal and distal bilateral upper and lower extremities.  Sensation intact to light touch in all 4 extremities.    Skin: Skin is warm and dry.  No rash noted.   Psych: Normal mood and affect. Behavior is normal.       Diagnostics     Lab Results   Labs Ordered and Resulted from Time of ED Arrival to Time of ED Departure - No data to display    Imaging   Head CT w/o contrast   Final Result   IMPRESSION:   1.  No acute intracranial process.             Independent Interpretation   CT Head: No intracranial hemorrhage or midline shift.    ED Course      Medications Administered   Medications   acetaminophen (TYLENOL) tablet 1,000 mg (1,000 mg Oral $Given 3/5/25 1558)   ketorolac (TORADOL) injection 15 mg (15 mg Intravenous $Given 3/5/25 1557)   metoclopramide (REGLAN) injection 10 mg (10 mg Intravenous $Given 3/5/25 1558)   diphenhydrAMINE (BENADRYL) injection 12.5 mg (12.5 mg Intravenous $Given 3/5/25 1557)   dexAMETHasone PF (DECADRON) injection 10 mg (10 mg Intravenous $Given 3/5/25 1751)   oxyCODONE (ROXICODONE) tablet 5 mg (5 mg Oral $Given 3/5/25 1752)       Procedures   Procedures     Discussion of Management   None    ED Course   ED Course as of 03/05/25 2023   Wed Mar 05, 2025   1522 I obtained history and examined the patient as noted above.    1738 I rechecked and updated the  patient. Patient's headache has not improved.   1936 I rechecked and updated the patient. Patient's headache is better and she is ready to go home.       Additional Documentation  None    Medical Decision Making / Diagnosis     CMS Diagnoses: None    MIPS       None    MDM   Dot Lujan is a 50 year old female presenting with headache.  She endorses suffering from migraines and this feels similar to her usual migraine.  She has no focal neurodeficits.  She is given a migraine cocktail consisting of IV Benadryl and Reglan.  Upon reevaluation, she reports she has not improved.  Head CT is obtained and is negative for acute intracranial hemorrhage.  She is given oxycodone and Decadron.  Upon reevaluation, she reports she is significantly improved.  She feels comfortable discharging home.  She is encouraged to follow-up with her primary care provider.  Return precautions are given and she verbalizes understanding.  She is discharged home in stable condition.    Disposition   The patient was discharged.     Diagnosis     ICD-10-CM    1. Acute intractable headache, unspecified headache type  R51.9            Discharge Medications   Discharge Medication List as of 3/5/2025  7:48 PM            Scribe Disclosure:  I, Gabby Meyer, am serving as a scribe at 3:34 PM on 3/5/2025 to document services personally performed by Hung Kaplan MD based on my observations and the provider's statements to me.        Hung Kaplan MD  03/05/25 2024

## 2025-04-20 ENCOUNTER — HOSPITAL ENCOUNTER (EMERGENCY)
Facility: CLINIC | Age: 50
Discharge: NURSING, REHAB OR LONG-TERM CARE | End: 2025-04-20
Attending: EMERGENCY MEDICINE | Admitting: EMERGENCY MEDICINE
Payer: COMMERCIAL

## 2025-04-20 ENCOUNTER — APPOINTMENT (OUTPATIENT)
Dept: CT IMAGING | Facility: CLINIC | Age: 50
End: 2025-04-20
Attending: EMERGENCY MEDICINE
Payer: COMMERCIAL

## 2025-04-20 VITALS
SYSTOLIC BLOOD PRESSURE: 122 MMHG | TEMPERATURE: 98.7 F | HEART RATE: 67 BPM | OXYGEN SATURATION: 97 % | RESPIRATION RATE: 18 BRPM | DIASTOLIC BLOOD PRESSURE: 82 MMHG

## 2025-04-20 DIAGNOSIS — R07.9 CHEST PAIN, UNSPECIFIED TYPE: ICD-10-CM

## 2025-04-20 DIAGNOSIS — U07.1 INFECTION DUE TO 2019 NOVEL CORONAVIRUS: ICD-10-CM

## 2025-04-20 LAB
ALBUMIN SERPL BCG-MCNC: 4 G/DL (ref 3.5–5.2)
ALBUMIN UR-MCNC: NEGATIVE MG/DL
ALP SERPL-CCNC: 168 U/L (ref 40–150)
ALT SERPL W P-5'-P-CCNC: 34 U/L (ref 0–50)
ANION GAP SERPL CALCULATED.3IONS-SCNC: 14 MMOL/L (ref 7–15)
APPEARANCE UR: CLEAR
AST SERPL W P-5'-P-CCNC: 21 U/L (ref 0–45)
BACTERIA #/AREA URNS HPF: ABNORMAL /HPF
BASOPHILS # BLD AUTO: 0 10E3/UL (ref 0–0.2)
BASOPHILS NFR BLD AUTO: 0 %
BILIRUB DIRECT SERPL-MCNC: <0.08 MG/DL (ref 0–0.3)
BILIRUB SERPL-MCNC: 0.2 MG/DL
BILIRUB UR QL STRIP: NEGATIVE
BUN SERPL-MCNC: 10.2 MG/DL (ref 6–20)
CALCIUM SERPL-MCNC: 8.9 MG/DL (ref 8.8–10.4)
CHLORIDE SERPL-SCNC: 105 MMOL/L (ref 98–107)
COLOR UR AUTO: ABNORMAL
CREAT SERPL-MCNC: 0.72 MG/DL (ref 0.51–0.95)
D DIMER PPP FEU-MCNC: 0.77 UG/ML FEU (ref 0–0.5)
EGFRCR SERPLBLD CKD-EPI 2021: >90 ML/MIN/1.73M2
EOSINOPHIL # BLD AUTO: 0.1 10E3/UL (ref 0–0.7)
EOSINOPHIL NFR BLD AUTO: 1 %
ERYTHROCYTE [DISTWIDTH] IN BLOOD BY AUTOMATED COUNT: 14.2 % (ref 10–15)
FLUAV RNA SPEC QL NAA+PROBE: NEGATIVE
FLUBV RNA RESP QL NAA+PROBE: NEGATIVE
GLUCOSE SERPL-MCNC: 203 MG/DL (ref 70–99)
GLUCOSE UR STRIP-MCNC: NEGATIVE MG/DL
HCG SERPL QL: NEGATIVE
HCO3 SERPL-SCNC: 18 MMOL/L (ref 22–29)
HCT VFR BLD AUTO: 35.9 % (ref 35–47)
HGB BLD-MCNC: 11.7 G/DL (ref 11.7–15.7)
HGB UR QL STRIP: ABNORMAL
IMM GRANULOCYTES # BLD: 0.1 10E3/UL
IMM GRANULOCYTES NFR BLD: 1 %
KETONES UR STRIP-MCNC: NEGATIVE MG/DL
LEUKOCYTE ESTERASE UR QL STRIP: ABNORMAL
LIPASE SERPL-CCNC: 47 U/L (ref 13–60)
LYMPHOCYTES # BLD AUTO: 0.6 10E3/UL (ref 0.8–5.3)
LYMPHOCYTES NFR BLD AUTO: 7 %
MCH RBC QN AUTO: 27.1 PG (ref 26.5–33)
MCHC RBC AUTO-ENTMCNC: 32.6 G/DL (ref 31.5–36.5)
MCV RBC AUTO: 83 FL (ref 78–100)
MONOCYTES # BLD AUTO: 0.4 10E3/UL (ref 0–1.3)
MONOCYTES NFR BLD AUTO: 5 %
MUCOUS THREADS #/AREA URNS LPF: PRESENT /LPF
NEUTROPHILS # BLD AUTO: 8.1 10E3/UL (ref 1.6–8.3)
NEUTROPHILS NFR BLD AUTO: 88 %
NITRATE UR QL: NEGATIVE
NRBC # BLD AUTO: 0 10E3/UL
NRBC BLD AUTO-RTO: 0 /100
PH UR STRIP: 5.5 [PH] (ref 5–7)
PLATELET # BLD AUTO: 251 10E3/UL (ref 150–450)
POTASSIUM SERPL-SCNC: 3.7 MMOL/L (ref 3.4–5.3)
PROT SERPL-MCNC: 6.7 G/DL (ref 6.4–8.3)
RBC # BLD AUTO: 4.31 10E6/UL (ref 3.8–5.2)
RBC URINE: 10 /HPF
RSV RNA SPEC NAA+PROBE: NEGATIVE
SARS-COV-2 RNA RESP QL NAA+PROBE: POSITIVE
SODIUM SERPL-SCNC: 137 MMOL/L (ref 135–145)
SP GR UR STRIP: 1.02 (ref 1–1.03)
SQUAMOUS EPITHELIAL: 5 /HPF
TROPONIN T SERPL HS-MCNC: <6 NG/L
UROBILINOGEN UR STRIP-MCNC: NORMAL MG/DL
WBC # BLD AUTO: 9.3 10E3/UL (ref 4–11)
WBC URINE: 8 /HPF

## 2025-04-20 PROCEDURE — 250N000013 HC RX MED GY IP 250 OP 250 PS 637: Performed by: EMERGENCY MEDICINE

## 2025-04-20 PROCEDURE — 83690 ASSAY OF LIPASE: CPT | Performed by: EMERGENCY MEDICINE

## 2025-04-20 PROCEDURE — 250N000009 HC RX 250: Performed by: EMERGENCY MEDICINE

## 2025-04-20 PROCEDURE — 36415 COLL VENOUS BLD VENIPUNCTURE: CPT | Performed by: EMERGENCY MEDICINE

## 2025-04-20 PROCEDURE — 250N000011 HC RX IP 250 OP 636: Performed by: EMERGENCY MEDICINE

## 2025-04-20 PROCEDURE — 80048 BASIC METABOLIC PNL TOTAL CA: CPT | Performed by: EMERGENCY MEDICINE

## 2025-04-20 PROCEDURE — 82248 BILIRUBIN DIRECT: CPT | Performed by: EMERGENCY MEDICINE

## 2025-04-20 PROCEDURE — 82310 ASSAY OF CALCIUM: CPT | Performed by: EMERGENCY MEDICINE

## 2025-04-20 PROCEDURE — 250N000011 HC RX IP 250 OP 636: Mod: JZ | Performed by: EMERGENCY MEDICINE

## 2025-04-20 PROCEDURE — 84703 CHORIONIC GONADOTROPIN ASSAY: CPT | Performed by: EMERGENCY MEDICINE

## 2025-04-20 PROCEDURE — 85004 AUTOMATED DIFF WBC COUNT: CPT | Performed by: EMERGENCY MEDICINE

## 2025-04-20 PROCEDURE — 96374 THER/PROPH/DIAG INJ IV PUSH: CPT | Mod: 59

## 2025-04-20 PROCEDURE — 71275 CT ANGIOGRAPHY CHEST: CPT

## 2025-04-20 PROCEDURE — 93005 ELECTROCARDIOGRAM TRACING: CPT

## 2025-04-20 PROCEDURE — 85379 FIBRIN DEGRADATION QUANT: CPT | Performed by: EMERGENCY MEDICINE

## 2025-04-20 PROCEDURE — 87637 SARSCOV2&INF A&B&RSV AMP PRB: CPT | Performed by: EMERGENCY MEDICINE

## 2025-04-20 PROCEDURE — 81003 URINALYSIS AUTO W/O SCOPE: CPT | Performed by: EMERGENCY MEDICINE

## 2025-04-20 PROCEDURE — 99285 EMERGENCY DEPT VISIT HI MDM: CPT | Mod: 25

## 2025-04-20 PROCEDURE — 81001 URINALYSIS AUTO W/SCOPE: CPT | Performed by: EMERGENCY MEDICINE

## 2025-04-20 PROCEDURE — 84484 ASSAY OF TROPONIN QUANT: CPT | Performed by: EMERGENCY MEDICINE

## 2025-04-20 RX ORDER — OXYCODONE HYDROCHLORIDE 5 MG/1
5 TABLET ORAL ONCE
Status: COMPLETED | OUTPATIENT
Start: 2025-04-20 | End: 2025-04-20

## 2025-04-20 RX ORDER — IOPAMIDOL 755 MG/ML
500 INJECTION, SOLUTION INTRAVASCULAR ONCE
Status: COMPLETED | OUTPATIENT
Start: 2025-04-20 | End: 2025-04-20

## 2025-04-20 RX ORDER — BENZONATATE 100 MG/1
100 CAPSULE ORAL 3 TIMES DAILY PRN
Qty: 30 CAPSULE | Refills: 0 | Status: SHIPPED | OUTPATIENT
Start: 2025-04-20 | End: 2025-04-20

## 2025-04-20 RX ORDER — HYDROMORPHONE HYDROCHLORIDE 1 MG/ML
0.5 INJECTION, SOLUTION INTRAMUSCULAR; INTRAVENOUS; SUBCUTANEOUS
Status: COMPLETED | OUTPATIENT
Start: 2025-04-20 | End: 2025-04-20

## 2025-04-20 RX ORDER — BENZONATATE 100 MG/1
100 CAPSULE ORAL 3 TIMES DAILY PRN
Qty: 30 CAPSULE | Refills: 0 | Status: SHIPPED | OUTPATIENT
Start: 2025-04-20

## 2025-04-20 RX ADMIN — OXYCODONE HYDROCHLORIDE 5 MG: 5 TABLET ORAL at 10:38

## 2025-04-20 RX ADMIN — SODIUM CHLORIDE, PRESERVATIVE FREE 100 ML: 5 INJECTION INTRAVENOUS at 09:57

## 2025-04-20 RX ADMIN — HYDROMORPHONE HYDROCHLORIDE 0.5 MG: 1 INJECTION, SOLUTION INTRAMUSCULAR; INTRAVENOUS; SUBCUTANEOUS at 07:48

## 2025-04-20 RX ADMIN — IOPAMIDOL 77 ML: 755 INJECTION, SOLUTION INTRAVENOUS at 09:57

## 2025-04-20 ASSESSMENT — ACTIVITIES OF DAILY LIVING (ADL)
ADLS_ACUITY_SCORE: 41

## 2025-04-20 ASSESSMENT — COLUMBIA-SUICIDE SEVERITY RATING SCALE - C-SSRS
1. IN THE PAST MONTH, HAVE YOU WISHED YOU WERE DEAD OR WISHED YOU COULD GO TO SLEEP AND NOT WAKE UP?: NO
2. HAVE YOU ACTUALLY HAD ANY THOUGHTS OF KILLING YOURSELF IN THE PAST MONTH?: NO
6. HAVE YOU EVER DONE ANYTHING, STARTED TO DO ANYTHING, OR PREPARED TO DO ANYTHING TO END YOUR LIFE?: NO

## 2025-04-20 NOTE — DISCHARGE INSTRUCTIONS
Tylenol 1000mg every 8 hours for fever and pain  Stay hydrated and rested  Paxlovid for 5 days  While taking Paxlovid, stop atorvastatin and trazodone. Resume these medications 3 days after last dose of Paxlovid  Cough med as needed  Return if worsening symptoms

## 2025-04-20 NOTE — ED TRIAGE NOTES
Arrives via EMS from group home. Co chest pain substernal radiates to back, worse with deep breathing. Asprin in route. NSR. VSS on RA. A&O.      Triage Assessment (Adult)       Row Name 04/20/25 0635          Triage Assessment    Airway WDL WDL        Respiratory WDL    Respiratory WDL WDL        Skin Circulation/Temperature WDL    Skin Circulation/Temperature WDL WDL        Cardiac WDL    Cardiac WDL X;chest pain     Cardiac Rhythm NSR        Chest Pain Assessment    Chest Pain Location substernal        Peripheral/Neurovascular WDL    Peripheral Neurovascular WDL WDL        Cognitive/Neuro/Behavioral WDL    Cognitive/Neuro/Behavioral WDL WDL

## 2025-04-20 NOTE — ED PROVIDER NOTES
Emergency Department Note      History of Present Illness     Chief Complaint   Chest Pain      HPI   Dot Lujan is a 50 year old female with history of asthma, hypertension, and hyperlipidemia who presents to the ED via EMS from care home for evaluation of chest pain. Patient reports waking up this morning with central chest pain, radiating around her chest to her back. Associated cough. She notes feeling fine when she went to bed last night. She reports recent cloudy looking urine. Denies fever, vomiting, diarrhea, or dysuria. No known sick contacts at group [facility].     Independent Historian   None    Review of External Notes   none    Past Medical History     Medical History and Problem List   AG  Asthma  Major depressive disorder  Hyperlipidemia   Fibromyalgia  GERD  Hypertension  Methamphetamine abuse  Migraine  Opioid abuse  Bilateral occipital neuralgia  Chronic migraine  Tobacco dependence   CVA, residual left sided weakness   IBS   Deafness in left ear   Allergic rhinitis  Hemorrhoids    Medications   Albuterol  Prednisone  Atorvastatin  Dicyclomine  Fluoxetine  Gabapentin  Lisinopril  Prilosec  Trazodone      Surgical History   Appendectomy     Ovarian cyst removal   Cataract removal, right eye     Physical Exam     Patient Vitals for the past 24 hrs:   BP Temp Temp src Pulse Resp SpO2   25 0930 -- -- -- 71 14 95 %   25 0915 -- -- -- 75 14 95 %   25 0900 -- -- -- 73 15 95 %   25 0845 -- -- -- 74 13 95 %   25 0830 -- -- -- 76 14 95 %   25 0815 -- -- -- 77 15 96 %   25 0800 -- -- -- 75 15 95 %   25 0745 -- -- -- 84 20 97 %   25 0709 -- -- -- 77 18 96 %   25 0634 129/79 98.7  F (37.1  C) Axillary 79 19 95 %     Physical Exam  Constitutional:       Appearance: She is well-developed.   HENT:      Right Ear: External ear normal.      Left Ear: External ear normal.      Mouth/Throat:      Mouth: Mucous membranes are moist.       Pharynx: Oropharynx is clear. No oropharyngeal exudate or posterior oropharyngeal erythema.   Eyes:      General: No scleral icterus.     Extraocular Movements: Extraocular movements intact.      Conjunctiva/sclera: Conjunctivae normal.      Pupils: Pupils are equal, round, and reactive to light.   Cardiovascular:      Rate and Rhythm: Normal rate and regular rhythm.      Heart sounds: Normal heart sounds. No murmur heard.     No friction rub. No gallop.   Pulmonary:      Effort: Pulmonary effort is normal. No respiratory distress.      Breath sounds: Normal breath sounds. No stridor. No wheezing, rhonchi or rales.      Comments: Reproducible sternal TTP  Chest:      Chest wall: Tenderness present.   Abdominal:      General: Bowel sounds are normal. There is no distension.      Palpations: Abdomen is soft. There is no mass.      Tenderness: There is no abdominal tenderness.   Musculoskeletal:         General: Normal range of motion.   Skin:     General: Skin is warm and dry.      Findings: No rash.   Neurological:      Mental Status: She is alert and oriented to person, place, and time.      Cranial Nerves: No cranial nerve deficit.           Diagnostics     Lab Results   Labs Ordered and Resulted from Time of ED Arrival to Time of ED Departure   BASIC METABOLIC PANEL - Abnormal       Result Value    Sodium 137      Potassium 3.7      Chloride 105      Carbon Dioxide (CO2) 18 (*)     Anion Gap 14      Urea Nitrogen 10.2      Creatinine 0.72      GFR Estimate >90      Calcium 8.9      Glucose 203 (*)    CBC WITH PLATELETS AND DIFFERENTIAL - Abnormal    WBC Count 9.3      RBC Count 4.31      Hemoglobin 11.7      Hematocrit 35.9      MCV 83      MCH 27.1      MCHC 32.6      RDW 14.2      Platelet Count 251      % Neutrophils 88      % Lymphocytes 7      % Monocytes 5      % Eosinophils 1      % Basophils 0      % Immature Granulocytes 1      NRBCs per 100 WBC 0      Absolute Neutrophils 8.1      Absolute Lymphocytes  0.6 (*)     Absolute Monocytes 0.4      Absolute Eosinophils 0.1      Absolute Basophils 0.0      Absolute Immature Granulocytes 0.1      Absolute NRBCs 0.0     INFLUENZA A/B, RSV AND SARS-COV2 PCR - Abnormal    Influenza A PCR Negative      Influenza B PCR Negative      RSV PCR Negative      SARS CoV2 PCR Positive (*)    HEPATIC FUNCTION PANEL - Abnormal    Protein Total 6.7      Albumin 4.0      Bilirubin Total 0.2      Alkaline Phosphatase 168 (*)     AST 21      ALT 34      Bilirubin Direct <0.08     D DIMER QUANTITATIVE - Abnormal    D-Dimer Quantitative 0.77 (*)    UA MACROSCOPIC WITH REFLEX TO MICRO AND CULTURE - Abnormal    Color Urine Light Yellow      Appearance Urine Clear      Glucose Urine Negative      Bilirubin Urine Negative      Ketones Urine Negative      Specific Gravity Urine 1.025      Blood Urine Moderate (*)     pH Urine 5.5      Protein Albumin Urine Negative      Urobilinogen Urine Normal      Nitrite Urine Negative      Leukocyte Esterase Urine Trace (*)     Bacteria Urine Few (*)     Mucus Urine Present (*)     RBC Urine 10 (*)     WBC Urine 8 (*)     Squamous Epithelials Urine 5 (*)    TROPONIN T, HIGH SENSITIVITY - Normal    Troponin T, High Sensitivity <6     HCG QUALITATIVE PREGNANCY - Normal    hCG Serum Qualitative Negative     LIPASE - Normal    Lipase 47         Imaging   CT Chest Pulmonary Embolism w Contrast   Final Result   IMPRESSION:   1.  No evidence of acute pulmonary embolism within limitations.   2.  Trace bilateral pleural effusions.          EKG   ECG results from 04/20/25   EKG 12-lead, tracing only     Value    Systolic Blood Pressure     Diastolic Blood Pressure     Ventricular Rate 74    Atrial Rate 74    ID Interval 144    QRS Duration 70        QTc 424    P Axis 34    R AXIS 18    T Axis 23    Interpretation ECG      Sinus rhythm  Nonspecific T wave abnormality  Abnormal ECG  When compared with ECG of 30-Jun-2024 17:15,  T wave inversion now evident in  Anterior leads  Read by me at 0656       Independent Interpretation   None    ED Course      Medications Administered   Medications   oxyCODONE (ROXICODONE) tablet 5 mg (has no administration in time range)   HYDROmorphone (PF) (DILAUDID) injection 0.5 mg (0.5 mg Intravenous $Given 4/20/25 0748)   sodium chloride 0.9 % bag for CT scan flush (100 mLs Intravenous $Given 4/20/25 0957)   iopamidol (ISOVUE-370) solution 500 mL (77 mLs Intravenous $Given 4/20/25 0957)       Procedures   Procedures     Discussion of Management   None    ED Course   ED Course as of 04/20/25 1034   Sun Apr 20, 2025   0657 I obtained history and examined the patient as noted above.    1033 I discussed discharge instructions with the patient, patient is ok with this. Group home is going to pick her up.        Additional Documentation  None    Medical Decision Making / Diagnosis     CMS Diagnoses: None    MIPS   CT for PE was ordered because the patient had an abnormal d-dimer.    [facility]   Dot Lujan is a 50 year old female who presents with chest pain.  Patient did have a cough that started and tested positive for COVID today.  D-dimer was elevated so CT chest was performed.  Thankfully there is no signs of PE or pneumonia.  Patient was not hypoxic and is deemed appropriate for outpatient management.  We discussed use of Paxlovid for now.  She agrees to do that wants to start that today.  Prescription sent to her pharmacy.  I did discuss on her discharge paperwork that she needs to stop taking atorvastatin and trazodone for now.  Patient was discharged by her group home in stable condition.  I recommended Tylenol for pain management.  Return precaution provided.    Disposition   The patient was discharged.     Diagnosis     ICD-10-CM    1. Infection due to 2019 novel coronavirus  U07.1       2. Chest pain, unspecified type  R07.9            Discharge Medications   Discharge Medication List as of 4/20/2025 10:49 AM        START  taking these medications    Details   benzonatate (TESSALON) 100 MG capsule Take 1 capsule (100 mg) by mouth 3 times daily as needed for cough., Disp-30 capsule, R-0, E-Prescribe      nirmatrelvir and ritonavir (PAXLOVID) 300 mg/100 mg therapy pack Take 3 tablets by mouth 2 times daily for 5 days., Disp-30 tablet, R-0, E-PrescribeDate of symptom onset: today; Risk criteria met: Yes; Weight >40 kg Yes; Renal fxn: normal;  Drug-Drug interactions reviewed & addressed: Yes               Scribe Disclosure:  Licha NGUYEN, am serving as a scribe at 7:03 AM on 4/20/2025 to document services personally performed by Augustin Pastor MD based on my observations and the provider's statements to me.        Augustin Pastor MD  04/20/25 8445

## 2025-04-21 LAB
ATRIAL RATE - MUSE: 74 BPM
DIASTOLIC BLOOD PRESSURE - MUSE: NORMAL MMHG
INTERPRETATION ECG - MUSE: NORMAL
P AXIS - MUSE: 34 DEGREES
PR INTERVAL - MUSE: 144 MS
QRS DURATION - MUSE: 70 MS
QT - MUSE: 382 MS
QTC - MUSE: 424 MS
R AXIS - MUSE: 18 DEGREES
SYSTOLIC BLOOD PRESSURE - MUSE: NORMAL MMHG
T AXIS - MUSE: 23 DEGREES
VENTRICULAR RATE- MUSE: 74 BPM

## 2025-07-21 ENCOUNTER — HOSPITAL ENCOUNTER (EMERGENCY)
Facility: CLINIC | Age: 50
Discharge: HOME | End: 2025-07-22
Attending: EMERGENCY MEDICINE
Payer: COMMERCIAL

## 2025-07-21 DIAGNOSIS — G43.901 MIGRAINE WITH STATUS MIGRAINOSUS, NOT INTRACTABLE, UNSPECIFIED MIGRAINE TYPE: ICD-10-CM

## 2025-07-21 DIAGNOSIS — R11.2 NAUSEA AND VOMITING, UNSPECIFIED VOMITING TYPE: ICD-10-CM

## 2025-07-21 PROCEDURE — 96365 THER/PROPH/DIAG IV INF INIT: CPT

## 2025-07-21 PROCEDURE — 96361 HYDRATE IV INFUSION ADD-ON: CPT

## 2025-07-21 PROCEDURE — 250N000011 HC RX IP 250 OP 636: Performed by: EMERGENCY MEDICINE

## 2025-07-21 PROCEDURE — 99284 EMERGENCY DEPT VISIT MOD MDM: CPT | Mod: 25 | Performed by: EMERGENCY MEDICINE

## 2025-07-21 PROCEDURE — 258N000003 HC RX IP 258 OP 636: Performed by: EMERGENCY MEDICINE

## 2025-07-21 PROCEDURE — 250N000013 HC RX MED GY IP 250 OP 250 PS 637: Performed by: EMERGENCY MEDICINE

## 2025-07-21 PROCEDURE — 96375 TX/PRO/DX INJ NEW DRUG ADDON: CPT

## 2025-07-21 PROCEDURE — 250N000011 HC RX IP 250 OP 636: Performed by: STUDENT IN AN ORGANIZED HEALTH CARE EDUCATION/TRAINING PROGRAM

## 2025-07-21 RX ORDER — METOCLOPRAMIDE HYDROCHLORIDE 5 MG/ML
10 INJECTION INTRAMUSCULAR; INTRAVENOUS ONCE
Status: COMPLETED | OUTPATIENT
Start: 2025-07-21 | End: 2025-07-21

## 2025-07-21 RX ORDER — OLANZAPINE 5 MG/1
5 TABLET, ORALLY DISINTEGRATING ORAL ONCE
Status: COMPLETED | OUTPATIENT
Start: 2025-07-21 | End: 2025-07-21

## 2025-07-21 RX ORDER — KETOROLAC TROMETHAMINE 15 MG/ML
15 INJECTION, SOLUTION INTRAMUSCULAR; INTRAVENOUS ONCE
Status: COMPLETED | OUTPATIENT
Start: 2025-07-21 | End: 2025-07-21

## 2025-07-21 RX ORDER — DEXAMETHASONE SODIUM PHOSPHATE 10 MG/ML
10 INJECTION, SOLUTION INTRAMUSCULAR; INTRAVENOUS ONCE
Status: COMPLETED | OUTPATIENT
Start: 2025-07-21 | End: 2025-07-21

## 2025-07-21 RX ORDER — ONDANSETRON 4 MG/1
4 TABLET, ORALLY DISINTEGRATING ORAL ONCE
Status: COMPLETED | OUTPATIENT
Start: 2025-07-21 | End: 2025-07-21

## 2025-07-21 RX ORDER — MAGNESIUM SULFATE HEPTAHYDRATE 40 MG/ML
2 INJECTION, SOLUTION INTRAVENOUS ONCE
Status: COMPLETED | OUTPATIENT
Start: 2025-07-21 | End: 2025-07-22

## 2025-07-21 RX ORDER — DIPHENHYDRAMINE HYDROCHLORIDE 50 MG/ML
25 INJECTION, SOLUTION INTRAMUSCULAR; INTRAVENOUS ONCE
Status: COMPLETED | OUTPATIENT
Start: 2025-07-21 | End: 2025-07-21

## 2025-07-21 RX ADMIN — OLANZAPINE 5 MG: 5 TABLET, ORALLY DISINTEGRATING ORAL at 23:45

## 2025-07-21 RX ADMIN — SODIUM CHLORIDE 1000 ML: 0.9 INJECTION, SOLUTION INTRAVENOUS at 22:41

## 2025-07-21 RX ADMIN — ONDANSETRON 4 MG: 4 TABLET, ORALLY DISINTEGRATING ORAL at 20:56

## 2025-07-21 RX ADMIN — DIPHENHYDRAMINE HYDROCHLORIDE 25 MG: 50 INJECTION, SOLUTION INTRAMUSCULAR; INTRAVENOUS at 22:41

## 2025-07-21 RX ADMIN — MAGNESIUM SULFATE HEPTAHYDRATE 2 G: 40 INJECTION, SOLUTION INTRAVENOUS at 23:45

## 2025-07-21 RX ADMIN — DEXAMETHASONE SODIUM PHOSPHATE 10 MG: 10 INJECTION, SOLUTION INTRAMUSCULAR; INTRAVENOUS at 22:41

## 2025-07-21 RX ADMIN — METOCLOPRAMIDE 10 MG: 5 INJECTION, SOLUTION INTRAMUSCULAR; INTRAVENOUS at 22:41

## 2025-07-21 RX ADMIN — KETOROLAC TROMETHAMINE 15 MG: 15 INJECTION, SOLUTION INTRAMUSCULAR; INTRAVENOUS at 22:41

## 2025-07-21 ASSESSMENT — ACTIVITIES OF DAILY LIVING (ADL)
ADLS_ACUITY_SCORE: 41

## 2025-07-22 VITALS
RESPIRATION RATE: 20 BRPM | HEART RATE: 65 BPM | BODY MASS INDEX: 31.01 KG/M2 | SYSTOLIC BLOOD PRESSURE: 118 MMHG | TEMPERATURE: 97.1 F | WEIGHT: 175 LBS | DIASTOLIC BLOOD PRESSURE: 70 MMHG | OXYGEN SATURATION: 100 % | HEIGHT: 63 IN

## 2025-07-22 PROCEDURE — 250N000013 HC RX MED GY IP 250 OP 250 PS 637: Performed by: EMERGENCY MEDICINE

## 2025-07-22 RX ORDER — TRAZODONE HYDROCHLORIDE 100 MG/1
200 TABLET ORAL ONCE
Status: COMPLETED | OUTPATIENT
Start: 2025-07-22 | End: 2025-07-22

## 2025-07-22 RX ADMIN — TRAZODONE HYDROCHLORIDE 200 MG: 100 TABLET ORAL at 00:46

## 2025-07-22 NOTE — ED PROVIDER NOTES
"  Emergency Department Note      History of Present Illness     Chief Complaint   Headache      HPI   Dot Lujan is a 50 year old female with a past medical history significant for migraines, CVA, polysubstance abuse, hyperlipidemia, and hypertension, who presents to the ED for evaluation of a headache. The patient reports yesterday afternoon, she felt the sudden onset of an intense headache. She is experiencing sensitivity to sound and light. She notes that 2 hours ago, she had two episodes of emesis, and was unable to orally intake her nightly medications as she vomited them.  This includes naproxen and trazodone 200mg.  She endorses upper abdominal pain and photopsia. She denies chest pain. The patient states she had a similar episode 4 months ago.      Independent Historian   None    Review of External Notes   Most recent ED visit for similar was in March    Past Medical History     Medical History and Problem List   Anxiety  Asthma  Depressive disorder  Fibromyalgia  GERD  Hypertension  Methamphetamine abuse  Migraine  Opioid abuse  CVA  Hyperlipidemia     Medications   atorvastatin   atenolol  benzonatate   dicyclomine   ferrous sulfate   Fluoxetine   gabapentin   lisinopril   methocarbamol  Olanzapine   omeprazole   prednisone  Sumatriptan   trazodone     Physical Exam     Patient Vitals for the past 24 hrs:   BP Temp Temp src Pulse Resp SpO2 Height Weight   07/21/25 2051 118/70 97.1  F (36.2  C) Temporal 59 18 100 % 1.6 m (5' 3\") 79.4 kg (175 lb)     Physical Exam  Eyes:  Sclera white; Pupils are equal and round  ENT:    External ears and nares normal  CV:  Rate as above with regular rhythm   Resp:  Breath sounds clear and equal bilaterally    Non-labored, no retractions or accessory muscle use  GI:  Abdomen is soft, non-tender, non-distended    No rebound tenderness or peritoneal features  MS:  Moves all extremities  Skin:  Warm and dry  Neuro:  Speech is normal and fluent.  Lights dimmed.  " Wearing headphones and sunglasses.      Diagnostics     Lab Results   Labs Ordered and Resulted from Time of ED Arrival to Time of ED Departure - No data to display    Imaging   No orders to display       EKG   None    Independent Interpretation   None    ED Course      Medications Administered   Medications   traZODone (DESYREL) tablet 200 mg (has no administration in time range)   ondansetron (ZOFRAN ODT) ODT tab 4 mg (4 mg Oral $Given 7/21/25 2056)   metoclopramide (REGLAN) injection 10 mg (10 mg Intravenous $Given 7/21/25 2241)   ketorolac (TORADOL) injection 15 mg (15 mg Intravenous $Given 7/21/25 2241)   diphenhydrAMINE (BENADRYL) injection 25 mg (25 mg Intravenous $Given 7/21/25 2241)   dexAMETHasone PF (DECADRON) injection 10 mg (10 mg Intravenous $Given 7/21/25 2241)   sodium chloride 0.9% BOLUS 1,000 mL (0 mLs Intravenous Stopped 7/22/25 0042)   OLANZapine zydis (zyPREXA) ODT tab 5 mg (5 mg Oral $Given 7/21/25 2345)   magnesium sulfate 2 g in 50 mL sterile water intermittent infusion (0 g Intravenous Stopped 7/22/25 0042)       Procedures   Procedures     Discussion of Management   None    ED Course   ED Course as of 07/22/25 0042 Mon Jul 21, 2025 2132 I evaluated the patient and obtained history as detailed above.         Additional Documentation  None    Medical Decision Making / Diagnosis     CMS Diagnoses: None    MIPS   None     MDM   Dot Lujan is a 50 year old female who presents with a headache consistent with her typical migraine.  Meningitis, pseudotumor, subarachnoid hemorrhage, CNS tumor, venous thrombosis and stroke are considered as part of the differential, and considered unlikely based on H&P. There has been no trauma to increase risk of intracranial bleed.  I do not believe imaging is indicated at this time.  Her pain has improved with medication interventions.  No further episodes of vomiting in the ED.  Requested her trazodone dose that she had vomited at home.  Most  appropriate timing of giving this tonight would be when her ride is available to bring her home so that it's onset is once she gets home.      I recommended she return for worse pain, fever, vomiting, weakness, or any other concerns.    Disposition   The patient was discharged.     Diagnosis     ICD-10-CM    1. Migraine with status migrainosus, not intractable, unspecified migraine type  G43.901       2. Nausea and vomiting, unspecified vomiting type  R11.2            Discharge Medications   New Prescriptions    No medications on file         Scribe Disclosure:  I, Elba Matta, am serving as a scribe at 9:45 PM on 7/21/2025 to document services personally performed by Shauna Wynne, based on my observations and the provider's statements to me.        Shauna Wynne MD  07/22/25 0044

## 2025-07-22 NOTE — ED TRIAGE NOTES
Headache started yesterday afternoon. Vomit 2 times pta. 8/10 pain, meds not helping. Sensitive to light and sound. AO VSS

## 2025-07-30 ENCOUNTER — HOSPITAL ENCOUNTER (EMERGENCY)
Facility: CLINIC | Age: 50
Discharge: HOME | End: 2025-07-30
Attending: EMERGENCY MEDICINE | Admitting: EMERGENCY MEDICINE
Payer: COMMERCIAL

## 2025-07-30 VITALS
HEART RATE: 50 BPM | RESPIRATION RATE: 16 BRPM | OXYGEN SATURATION: 96 % | SYSTOLIC BLOOD PRESSURE: 129 MMHG | DIASTOLIC BLOOD PRESSURE: 62 MMHG | TEMPERATURE: 97.9 F

## 2025-07-30 DIAGNOSIS — R51.9 ACUTE NONINTRACTABLE HEADACHE, UNSPECIFIED HEADACHE TYPE: Primary | ICD-10-CM

## 2025-07-30 PROCEDURE — 96375 TX/PRO/DX INJ NEW DRUG ADDON: CPT

## 2025-07-30 PROCEDURE — 96374 THER/PROPH/DIAG INJ IV PUSH: CPT

## 2025-07-30 PROCEDURE — 250N000011 HC RX IP 250 OP 636: Performed by: EMERGENCY MEDICINE

## 2025-07-30 PROCEDURE — 250N000013 HC RX MED GY IP 250 OP 250 PS 637: Performed by: EMERGENCY MEDICINE

## 2025-07-30 PROCEDURE — 99284 EMERGENCY DEPT VISIT MOD MDM: CPT | Mod: 25 | Performed by: EMERGENCY MEDICINE

## 2025-07-30 RX ORDER — KETOROLAC TROMETHAMINE 15 MG/ML
15 INJECTION, SOLUTION INTRAMUSCULAR; INTRAVENOUS ONCE
Status: COMPLETED | OUTPATIENT
Start: 2025-07-30 | End: 2025-07-30

## 2025-07-30 RX ORDER — KETOROLAC TROMETHAMINE 30 MG/ML
30 INJECTION, SOLUTION INTRAMUSCULAR; INTRAVENOUS ONCE
Status: DISCONTINUED | OUTPATIENT
Start: 2025-07-30 | End: 2025-07-30

## 2025-07-30 RX ORDER — CYCLOBENZAPRINE HCL 10 MG
5-10 TABLET ORAL 3 TIMES DAILY PRN
Qty: 20 TABLET | Refills: 0 | Status: SHIPPED | OUTPATIENT
Start: 2025-07-30

## 2025-07-30 RX ORDER — LIDOCAINE 4 G/G
1 PATCH TOPICAL EVERY 24 HOURS
Qty: 10 PATCH | Refills: 0 | Status: SHIPPED | OUTPATIENT
Start: 2025-07-30

## 2025-07-30 RX ORDER — DIPHENHYDRAMINE HYDROCHLORIDE 50 MG/ML
25 INJECTION, SOLUTION INTRAMUSCULAR; INTRAVENOUS ONCE
Status: COMPLETED | OUTPATIENT
Start: 2025-07-30 | End: 2025-07-30

## 2025-07-30 RX ORDER — METOCLOPRAMIDE HYDROCHLORIDE 5 MG/ML
10 INJECTION INTRAMUSCULAR; INTRAVENOUS ONCE
Status: COMPLETED | OUTPATIENT
Start: 2025-07-30 | End: 2025-07-30

## 2025-07-30 RX ORDER — LIDOCAINE 4 G/G
1 PATCH TOPICAL ONCE
Status: DISCONTINUED | OUTPATIENT
Start: 2025-07-30 | End: 2025-07-30 | Stop reason: HOSPADM

## 2025-07-30 RX ORDER — CYCLOBENZAPRINE HCL 10 MG
10 TABLET ORAL ONCE
Status: COMPLETED | OUTPATIENT
Start: 2025-07-30 | End: 2025-07-30

## 2025-07-30 RX ADMIN — CYCLOBENZAPRINE 10 MG: 10 TABLET, FILM COATED ORAL at 20:08

## 2025-07-30 RX ADMIN — KETOROLAC TROMETHAMINE 15 MG: 15 INJECTION, SOLUTION INTRAMUSCULAR; INTRAVENOUS at 18:27

## 2025-07-30 RX ADMIN — DIPHENHYDRAMINE HYDROCHLORIDE 25 MG: 50 INJECTION, SOLUTION INTRAMUSCULAR; INTRAVENOUS at 18:28

## 2025-07-30 RX ADMIN — LIDOCAINE 1 PATCH: 4 PATCH TOPICAL at 20:08

## 2025-07-30 RX ADMIN — METOCLOPRAMIDE 10 MG: 5 INJECTION, SOLUTION INTRAMUSCULAR; INTRAVENOUS at 18:28

## 2025-07-30 ASSESSMENT — ACTIVITIES OF DAILY LIVING (ADL)
ADLS_ACUITY_SCORE: 41

## 2025-07-30 NOTE — ED TRIAGE NOTES
Patient reports headache for the past two days. She has prescription for imitrex but did not take it. Patient reports neck pain in triage from headache, able to touch chin to chest.

## 2025-07-30 NOTE — ED PROVIDER NOTES
Emergency Department Note      History of Present Illness     Chief Complaint   Headache      HPI   Dot Lujan is a 50 year old female with history of migraine and tension headaches who presents to the emergency department for evaluation of headache for the last 3 days.  Reports symptoms persist despite taking Tylenol, last dose 3 PM.  Reports it has been a while since she has had a tension headache and believes this is the headache she presents with today because it is affecting both her shoulders and neck.  No fever, neck stiffness, nausea/vomiting.  Does report mild light sensitivity and dizziness which are consistent with her prior tension headaches.  Reports stress and allergens which she believes exacerbated her symptoms.  Denies thunderclap onset and headache has been gradual in nature.  Denies any unilateral weakness/numbness, vision change, speech change.    Independent Historian   None    Review of External Notes   None    Past Medical History     Medical History and Problem List   Past Medical History:   Diagnosis Date    Allergic rhinitis     Anxiety     Asthma     Depressive disorder     Dyslipidemia     Fibromyalgia     Gastroesophageal reflux disease     Hypertension     Methamphetamine abuse     Migraine     Opioid abuse        Medications   cyclobenzaprine (FLEXERIL) 10 MG tablet  Lidocaine (LIDOCARE) 4 % Patch  albuterol (PROVENTIL) (2.5 MG/3ML) 0.083% neb solution  atorvastatin (LIPITOR) 10 MG tablet  benzonatate (TESSALON) 100 MG capsule  diclofenac (VOLTAREN) 1 % topical gel  dicyclomine (BENTYL) 10 MG capsule  ferrous sulfate (FEROSUL) 325 (65 Fe) MG tablet  FLUoxetine (PROZAC) 20 MG capsule  gabapentin (NEURONTIN) 100 MG capsule  lisinopril (ZESTRIL) 10 MG tablet  magnesium 250 MG tablet  methocarbamol (ROBAXIN) 500 MG tablet  nirmatrelvir and ritonavir (PAXLOVID) 300 mg/100 mg therapy pack  OLANZapine zydis (ZYPREXA) 5 MG ODT  omeprazole (PRILOSEC OTC) 20 MG EC  tablet  predniSONE (DELTASONE) 20 MG tablet  SUMAtriptan (IMITREX) 50 MG tablet  traZODone (DESYREL) 100 MG tablet        Surgical History   No past surgical history on file.    Physical Exam     Patient Vitals for the past 24 hrs:   BP Temp Temp src Pulse Resp SpO2   07/30/25 2008 -- -- -- -- -- 96 %   07/30/25 2007 -- -- -- -- -- 95 %   07/30/25 2005 129/62 -- -- 50 -- --   07/30/25 1725 117/75 -- -- -- -- --   07/30/25 1724 -- 97.9  F (36.6  C) Temporal 56 16 99 %     Physical Exam  General: Alert, no acute distress; lights turned off in triage room, wearing sunglasses  Neuro:  PERRL.  EOMI.  No focal deficits  HEENT:  Moist mucous membranes. Conjunctiva normal.  No meningismus.   CV:  RRR, no m/r/g, skin warm and well perfused  Pulm:  CTAB, no wheezes/ronchi/rales.  No acute distress, breathing comfortably  GI:  Soft, nontender, nondistended.  No rebound or guarding.   MSK:  Moving all extremities.  No focal areas of edema, erythema  Skin:  WWP, no rashes, skin color normal, no diaphoresis  Psych:  Well-appearing, normal affect, regular speech    Diagnostics     Lab Results   Labs Ordered and Resulted from Time of ED Arrival to Time of ED Departure - No data to display    Imaging   No orders to display       EKG   None    Independent Interpretation   None    ED Course      Medications Administered   Medications   metoclopramide (REGLAN) injection 10 mg (10 mg Intravenous $Given 7/30/25 1828)   diphenhydrAMINE (BENADRYL) injection 25 mg (25 mg Intravenous $Given 7/30/25 1828)   ketorolac (TORADOL) injection 15 mg (15 mg Intravenous $Given 7/30/25 1827)   cyclobenzaprine (FLEXERIL) tablet 10 mg (10 mg Oral $Given 7/30/25 2008)       Procedures   Procedures     Discussion of Management   None    ED Course        Additional Documentation  None    Medical Decision Making / Diagnosis     CMS Diagnoses: None    MIPS   None               MDM   Dot Lujan is a 50 year old female with history of migraine  and tension headaches who presents to the emergency department for evaluation of 3 days of headache reminiscent of her prior bouts of tension headache.  Reports it has been a long time since she has had tension headaches but believes it is due to stress and her allergies.  No red flag thunderclap onset concerning for SAH/ICH.  Patient vitally stable, well-appearing and neurologically intact.  No signs/symptoms worrisome for CNS infection.  I do not feel any advanced imaging is indicated at this time.  Patient had complete resolution of her symptoms with the above interventions here in the ER.  Overall suspect primary headache syndrome exacerbation.  No indication for labs or imaging.  She requests muscle relaxant for muscle spasms in her shoulders.  She is overall safe to discharge home.  Supportive cares for home discussed she will follow-up closely with her primary care doctor regarding her ER visit.  Discussed return precautions for the emergency department.  All questions were answered prior to discharge.    Disposition   The patient was discharged.     Diagnosis     ICD-10-CM    1. Acute nonintractable headache, unspecified headache type  R51.9            Discharge Medications   Discharge Medication List as of 7/30/2025  8:01 PM        START taking these medications    Details   cyclobenzaprine (FLEXERIL) 10 MG tablet Take 0.5-1 tablets (5-10 mg) by mouth 3 times daily as needed for muscle spasms., Disp-20 tablet, R-0, E-Prescribe      Lidocaine (LIDOCARE) 4 % Patch Place 1 patch over 12 hours onto the skin every 24 hours. To prevent lidocaine toxicity, patient should be patch free for 12 hrs daily.Disp-10 patch, [id number]               MD Patricia Vasquez Edgar Ronald, MD  07/31/25 0157

## 2025-08-03 ENCOUNTER — APPOINTMENT (OUTPATIENT)
Dept: CT IMAGING | Facility: CLINIC | Age: 50
End: 2025-08-03
Attending: EMERGENCY MEDICINE
Payer: COMMERCIAL

## 2025-08-03 ENCOUNTER — HOSPITAL ENCOUNTER (EMERGENCY)
Facility: CLINIC | Age: 50
Discharge: HOME | End: 2025-08-03
Attending: EMERGENCY MEDICINE | Admitting: EMERGENCY MEDICINE
Payer: COMMERCIAL

## 2025-08-03 VITALS
DIASTOLIC BLOOD PRESSURE: 79 MMHG | TEMPERATURE: 97.4 F | HEIGHT: 63 IN | WEIGHT: 175.71 LBS | OXYGEN SATURATION: 98 % | RESPIRATION RATE: 26 BRPM | SYSTOLIC BLOOD PRESSURE: 128 MMHG | HEART RATE: 58 BPM | BODY MASS INDEX: 31.13 KG/M2

## 2025-08-03 DIAGNOSIS — R10.13 ABDOMINAL PAIN, EPIGASTRIC: Primary | ICD-10-CM

## 2025-08-03 DIAGNOSIS — J90 BILATERAL PLEURAL EFFUSION: ICD-10-CM

## 2025-08-03 DIAGNOSIS — T50.905A ADVERSE EFFECT OF DRUG, INITIAL ENCOUNTER: ICD-10-CM

## 2025-08-03 DIAGNOSIS — R19.7 VOMITING AND DIARRHEA: ICD-10-CM

## 2025-08-03 DIAGNOSIS — K52.9 COLITIS: ICD-10-CM

## 2025-08-03 DIAGNOSIS — R11.10 VOMITING AND DIARRHEA: ICD-10-CM

## 2025-08-03 LAB
ALBUMIN SERPL BCG-MCNC: 4.3 G/DL (ref 3.5–5.2)
ALP SERPL-CCNC: 156 U/L (ref 40–150)
ALT SERPL W P-5'-P-CCNC: 20 U/L (ref 0–50)
ANION GAP SERPL CALCULATED.3IONS-SCNC: 15 MMOL/L (ref 7–15)
AST SERPL W P-5'-P-CCNC: 19 U/L (ref 0–45)
BASOPHILS # BLD AUTO: 0 10E3/UL (ref 0–0.2)
BASOPHILS NFR BLD AUTO: 0 %
BILIRUB SERPL-MCNC: 0.2 MG/DL
BUN SERPL-MCNC: 9.7 MG/DL (ref 6–20)
CALCIUM SERPL-MCNC: 9 MG/DL (ref 8.8–10.4)
CHLORIDE SERPL-SCNC: 104 MMOL/L (ref 98–107)
CREAT SERPL-MCNC: 0.85 MG/DL (ref 0.51–0.95)
EGFRCR SERPLBLD CKD-EPI 2021: 84 ML/MIN/1.73M2
EOSINOPHIL # BLD AUTO: 0 10E3/UL (ref 0–0.7)
EOSINOPHIL NFR BLD AUTO: 0 %
ERYTHROCYTE [DISTWIDTH] IN BLOOD BY AUTOMATED COUNT: 14.7 % (ref 10–15)
GLUCOSE SERPL-MCNC: 107 MG/DL (ref 70–99)
HCG SERPL QL: NEGATIVE
HCO3 SERPL-SCNC: 19 MMOL/L (ref 22–29)
HCT VFR BLD AUTO: 37.4 % (ref 35–47)
HGB BLD-MCNC: 12 G/DL (ref 11.7–15.7)
IMM GRANULOCYTES # BLD: 0 10E3/UL
IMM GRANULOCYTES NFR BLD: 0 %
LIPASE SERPL-CCNC: 19 U/L (ref 13–60)
LYMPHOCYTES # BLD AUTO: 1.3 10E3/UL (ref 0.8–5.3)
LYMPHOCYTES NFR BLD AUTO: 13 %
MCH RBC QN AUTO: 26.8 PG (ref 26.5–33)
MCHC RBC AUTO-ENTMCNC: 32.1 G/DL (ref 31.5–36.5)
MCV RBC AUTO: 84 FL (ref 78–100)
MONOCYTES # BLD AUTO: 0.5 10E3/UL (ref 0–1.3)
MONOCYTES NFR BLD AUTO: 5 %
NEUTROPHILS # BLD AUTO: 8.2 10E3/UL (ref 1.6–8.3)
NEUTROPHILS NFR BLD AUTO: 82 %
NRBC # BLD AUTO: 0 10E3/UL
NRBC BLD AUTO-RTO: 0 /100
PLATELET # BLD AUTO: 263 10E3/UL (ref 150–450)
POTASSIUM SERPL-SCNC: 3.6 MMOL/L (ref 3.4–5.3)
PROT SERPL-MCNC: 7 G/DL (ref 6.4–8.3)
RBC # BLD AUTO: 4.48 10E6/UL (ref 3.8–5.2)
SODIUM SERPL-SCNC: 138 MMOL/L (ref 135–145)
WBC # BLD AUTO: 10 10E3/UL (ref 4–11)

## 2025-08-03 PROCEDURE — 80053 COMPREHEN METABOLIC PANEL: CPT | Performed by: EMERGENCY MEDICINE

## 2025-08-03 PROCEDURE — 96361 HYDRATE IV INFUSION ADD-ON: CPT

## 2025-08-03 PROCEDURE — 83690 ASSAY OF LIPASE: CPT | Performed by: EMERGENCY MEDICINE

## 2025-08-03 PROCEDURE — 96375 TX/PRO/DX INJ NEW DRUG ADDON: CPT

## 2025-08-03 PROCEDURE — 258N000003 HC RX IP 258 OP 636: Performed by: EMERGENCY MEDICINE

## 2025-08-03 PROCEDURE — 99285 EMERGENCY DEPT VISIT HI MDM: CPT | Mod: 25 | Performed by: EMERGENCY MEDICINE

## 2025-08-03 PROCEDURE — 74177 CT ABD & PELVIS W/CONTRAST: CPT

## 2025-08-03 PROCEDURE — 96374 THER/PROPH/DIAG INJ IV PUSH: CPT | Mod: 59

## 2025-08-03 PROCEDURE — 250N000011 HC RX IP 250 OP 636: Performed by: EMERGENCY MEDICINE

## 2025-08-03 PROCEDURE — 250N000009 HC RX 250: Performed by: EMERGENCY MEDICINE

## 2025-08-03 PROCEDURE — 250N000013 HC RX MED GY IP 250 OP 250 PS 637: Performed by: EMERGENCY MEDICINE

## 2025-08-03 PROCEDURE — 85025 COMPLETE CBC W/AUTO DIFF WBC: CPT | Performed by: EMERGENCY MEDICINE

## 2025-08-03 PROCEDURE — 84703 CHORIONIC GONADOTROPIN ASSAY: CPT | Performed by: EMERGENCY MEDICINE

## 2025-08-03 PROCEDURE — 36415 COLL VENOUS BLD VENIPUNCTURE: CPT | Performed by: EMERGENCY MEDICINE

## 2025-08-03 RX ORDER — ACETAMINOPHEN 500 MG
1 TABLET ORAL 3 TIMES DAILY
Qty: 21 CAPSULE | Refills: 0 | Status: SHIPPED | OUTPATIENT
Start: 2025-08-03 | End: 2025-08-10

## 2025-08-03 RX ORDER — MAGNESIUM HYDROXIDE/ALUMINUM HYDROXICE/SIMETHICONE 120; 1200; 1200 MG/30ML; MG/30ML; MG/30ML
15 SUSPENSION ORAL ONCE
Status: COMPLETED | OUTPATIENT
Start: 2025-08-03 | End: 2025-08-03

## 2025-08-03 RX ORDER — ONDANSETRON 2 MG/ML
4 INJECTION INTRAMUSCULAR; INTRAVENOUS EVERY 30 MIN PRN
Status: DISCONTINUED | OUTPATIENT
Start: 2025-08-03 | End: 2025-08-03 | Stop reason: HOSPADM

## 2025-08-03 RX ORDER — KETOROLAC TROMETHAMINE 15 MG/ML
10 INJECTION, SOLUTION INTRAMUSCULAR; INTRAVENOUS ONCE
Status: COMPLETED | OUTPATIENT
Start: 2025-08-03 | End: 2025-08-03

## 2025-08-03 RX ORDER — HYDROMORPHONE HYDROCHLORIDE 1 MG/ML
0.5 INJECTION, SOLUTION INTRAMUSCULAR; INTRAVENOUS; SUBCUTANEOUS ONCE
Status: COMPLETED | OUTPATIENT
Start: 2025-08-03 | End: 2025-08-03

## 2025-08-03 RX ORDER — IOPAMIDOL 755 MG/ML
500 INJECTION, SOLUTION INTRAVASCULAR ONCE
Status: COMPLETED | OUTPATIENT
Start: 2025-08-03 | End: 2025-08-03

## 2025-08-03 RX ORDER — ONDANSETRON 4 MG/1
4 TABLET, ORALLY DISINTEGRATING ORAL EVERY 8 HOURS PRN
Qty: 12 TABLET | Refills: 0 | Status: SHIPPED | OUTPATIENT
Start: 2025-08-03

## 2025-08-03 RX ADMIN — SODIUM CHLORIDE 1000 ML: 0.9 INJECTION, SOLUTION INTRAVENOUS at 15:48

## 2025-08-03 RX ADMIN — ALUMINUM HYDROXIDE, MAGNESIUM HYDROXIDE, AND DIMETHICONE 15 ML: 200; 20; 200 SUSPENSION ORAL at 15:47

## 2025-08-03 RX ADMIN — KETOROLAC TROMETHAMINE 10 MG: 15 INJECTION, SOLUTION INTRAMUSCULAR; INTRAVENOUS at 15:47

## 2025-08-03 RX ADMIN — ONDANSETRON 4 MG: 2 INJECTION, SOLUTION INTRAMUSCULAR; INTRAVENOUS at 15:46

## 2025-08-03 RX ADMIN — FAMOTIDINE 20 MG: 10 INJECTION, SOLUTION INTRAVENOUS at 15:47

## 2025-08-03 RX ADMIN — SODIUM CHLORIDE 62 ML: 9 INJECTION, SOLUTION INTRAVENOUS at 18:50

## 2025-08-03 RX ADMIN — IOPAMIDOL 88 ML: 755 INJECTION, SOLUTION INTRAVENOUS at 18:50

## 2025-08-03 RX ADMIN — HYDROMORPHONE HYDROCHLORIDE 0.5 MG: 1 INJECTION, SOLUTION INTRAMUSCULAR; INTRAVENOUS; SUBCUTANEOUS at 18:06

## 2025-08-03 ASSESSMENT — ACTIVITIES OF DAILY LIVING (ADL)
ADLS_ACUITY_SCORE: 41

## 2025-08-25 LAB
ALBUMIN SERPL BCG-MCNC: 4.2 G/DL (ref 3.5–5.2)
ALP SERPL-CCNC: 163 U/L (ref 40–150)
ALT SERPL W P-5'-P-CCNC: 25 U/L (ref 0–50)
ANION GAP SERPL CALCULATED.3IONS-SCNC: 13 MMOL/L (ref 7–15)
AST SERPL W P-5'-P-CCNC: 17 U/L (ref 0–45)
BASOPHILS # BLD AUTO: <0.03 10E3/UL (ref 0–0.2)
BASOPHILS NFR BLD AUTO: 0.2 %
BILIRUB SERPL-MCNC: 0.2 MG/DL
BUN SERPL-MCNC: 10.4 MG/DL (ref 6–20)
CALCIUM SERPL-MCNC: 9.4 MG/DL (ref 8.8–10.4)
CHLORIDE SERPL-SCNC: 105 MMOL/L (ref 98–107)
CREAT SERPL-MCNC: 0.79 MG/DL (ref 0.51–0.95)
EGFRCR SERPLBLD CKD-EPI 2021: >90 ML/MIN/1.73M2
EOSINOPHIL # BLD AUTO: 0.04 10E3/UL (ref 0–0.7)
EOSINOPHIL NFR BLD AUTO: 0.4 %
ERYTHROCYTE [DISTWIDTH] IN BLOOD BY AUTOMATED COUNT: 14.3 % (ref 10–15)
GLUCOSE SERPL-MCNC: 124 MG/DL (ref 70–99)
HCO3 SERPL-SCNC: 19 MMOL/L (ref 22–29)
HCT VFR BLD AUTO: 37.3 % (ref 35–47)
HGB BLD-MCNC: 12 G/DL (ref 11.7–15.7)
HOLD SPECIMEN: NORMAL
HOLD SPECIMEN: NORMAL
IMM GRANULOCYTES # BLD: 0.05 10E3/UL
IMM GRANULOCYTES NFR BLD: 0.5 %
LYMPHOCYTES # BLD AUTO: 1.89 10E3/UL (ref 0.8–5.3)
LYMPHOCYTES NFR BLD AUTO: 19.2 %
MCH RBC QN AUTO: 26.8 PG (ref 26.5–33)
MCHC RBC AUTO-ENTMCNC: 32.2 G/DL (ref 31.5–36.5)
MCV RBC AUTO: 83.4 FL (ref 78–100)
MONOCYTES # BLD AUTO: 0.5 10E3/UL (ref 0–1.3)
MONOCYTES NFR BLD AUTO: 5.1 %
NEUTROPHILS # BLD AUTO: 7.36 10E3/UL (ref 1.6–8.3)
NEUTROPHILS NFR BLD AUTO: 74.6 %
NRBC # BLD AUTO: <0.03 10E3/UL
NRBC BLD AUTO-RTO: 0 /100
PLATELET # BLD AUTO: 324 10E3/UL (ref 150–450)
POTASSIUM SERPL-SCNC: 4 MMOL/L (ref 3.4–5.3)
PROT SERPL-MCNC: 7.1 G/DL (ref 6.4–8.3)
RBC # BLD AUTO: 4.47 10E6/UL (ref 3.8–5.2)
SODIUM SERPL-SCNC: 137 MMOL/L (ref 135–145)
WBC # BLD AUTO: 9.86 10E3/UL (ref 4–11)

## 2025-08-25 PROCEDURE — 99285 EMERGENCY DEPT VISIT HI MDM: CPT | Mod: 25 | Performed by: EMERGENCY MEDICINE

## 2025-08-25 PROCEDURE — 85025 COMPLETE CBC W/AUTO DIFF WBC: CPT | Performed by: EMERGENCY MEDICINE

## 2025-08-25 PROCEDURE — 80053 COMPREHEN METABOLIC PANEL: CPT | Performed by: EMERGENCY MEDICINE

## 2025-08-25 PROCEDURE — 83690 ASSAY OF LIPASE: CPT | Performed by: EMERGENCY MEDICINE

## 2025-08-25 PROCEDURE — 82040 ASSAY OF SERUM ALBUMIN: CPT | Performed by: EMERGENCY MEDICINE

## 2025-08-25 PROCEDURE — 85004 AUTOMATED DIFF WBC COUNT: CPT | Performed by: EMERGENCY MEDICINE

## 2025-08-25 PROCEDURE — 36415 COLL VENOUS BLD VENIPUNCTURE: CPT | Performed by: EMERGENCY MEDICINE

## 2025-08-26 ENCOUNTER — APPOINTMENT (OUTPATIENT)
Dept: CT IMAGING | Facility: CLINIC | Age: 50
End: 2025-08-26
Attending: EMERGENCY MEDICINE
Payer: COMMERCIAL

## 2025-08-26 ENCOUNTER — HOSPITAL ENCOUNTER (EMERGENCY)
Facility: CLINIC | Age: 50
Discharge: HOME | End: 2025-08-26
Attending: EMERGENCY MEDICINE | Admitting: EMERGENCY MEDICINE
Payer: COMMERCIAL

## 2025-08-26 VITALS
BODY MASS INDEX: 30.7 KG/M2 | WEIGHT: 173.28 LBS | TEMPERATURE: 97.9 F | SYSTOLIC BLOOD PRESSURE: 122 MMHG | HEART RATE: 60 BPM | RESPIRATION RATE: 16 BRPM | DIASTOLIC BLOOD PRESSURE: 81 MMHG | OXYGEN SATURATION: 97 %

## 2025-08-26 DIAGNOSIS — R10.13 ABDOMINAL PAIN, EPIGASTRIC: Primary | ICD-10-CM

## 2025-08-26 DIAGNOSIS — R11.0 NAUSEA: ICD-10-CM

## 2025-08-26 LAB
ALBUMIN UR-MCNC: NEGATIVE MG/DL
APPEARANCE UR: CLEAR
BACTERIA #/AREA URNS HPF: ABNORMAL /HPF
BILIRUB UR QL STRIP: NEGATIVE
COLOR UR AUTO: ABNORMAL
GLUCOSE UR STRIP-MCNC: NEGATIVE MG/DL
HGB UR QL STRIP: NEGATIVE
KETONES UR STRIP-MCNC: NEGATIVE MG/DL
LEUKOCYTE ESTERASE UR QL STRIP: ABNORMAL
LIPASE SERPL-CCNC: 58 U/L (ref 13–60)
MUCOUS THREADS #/AREA URNS LPF: PRESENT /LPF
NITRATE UR QL: NEGATIVE
PH UR STRIP: 5.5 [PH] (ref 5–7)
RBC URINE: 2 /HPF
SP GR UR STRIP: 1.03 (ref 1–1.03)
SQUAMOUS EPITHELIAL: 2 /HPF
UROBILINOGEN UR STRIP-MCNC: NORMAL MG/DL
WBC URINE: 1 /HPF

## 2025-08-26 PROCEDURE — 96361 HYDRATE IV INFUSION ADD-ON: CPT

## 2025-08-26 PROCEDURE — 250N000011 HC RX IP 250 OP 636: Performed by: EMERGENCY MEDICINE

## 2025-08-26 PROCEDURE — 250N000009 HC RX 250: Performed by: EMERGENCY MEDICINE

## 2025-08-26 PROCEDURE — 96375 TX/PRO/DX INJ NEW DRUG ADDON: CPT

## 2025-08-26 PROCEDURE — 74177 CT ABD & PELVIS W/CONTRAST: CPT

## 2025-08-26 PROCEDURE — 255N000002 HC RX 255 OP 636: Performed by: EMERGENCY MEDICINE

## 2025-08-26 PROCEDURE — 250N000013 HC RX MED GY IP 250 OP 250 PS 637: Performed by: EMERGENCY MEDICINE

## 2025-08-26 PROCEDURE — 81001 URINALYSIS AUTO W/SCOPE: CPT | Performed by: EMERGENCY MEDICINE

## 2025-08-26 PROCEDURE — 258N000003 HC RX IP 258 OP 636: Performed by: EMERGENCY MEDICINE

## 2025-08-26 PROCEDURE — 96374 THER/PROPH/DIAG INJ IV PUSH: CPT | Mod: 59

## 2025-08-26 RX ORDER — MAGNESIUM HYDROXIDE/ALUMINUM HYDROXICE/SIMETHICONE 120; 1200; 1200 MG/30ML; MG/30ML; MG/30ML
15 SUSPENSION ORAL ONCE
Status: COMPLETED | OUTPATIENT
Start: 2025-08-26 | End: 2025-08-26

## 2025-08-26 RX ORDER — ONDANSETRON 2 MG/ML
4 INJECTION INTRAMUSCULAR; INTRAVENOUS ONCE
Status: COMPLETED | OUTPATIENT
Start: 2025-08-26 | End: 2025-08-26

## 2025-08-26 RX ORDER — HYDROMORPHONE HYDROCHLORIDE 1 MG/ML
0.5 INJECTION, SOLUTION INTRAMUSCULAR; INTRAVENOUS; SUBCUTANEOUS ONCE
Refills: 0 | Status: COMPLETED | OUTPATIENT
Start: 2025-08-26 | End: 2025-08-26

## 2025-08-26 RX ORDER — ONDANSETRON 4 MG/1
4 TABLET, ORALLY DISINTEGRATING ORAL EVERY 8 HOURS PRN
Qty: 10 TABLET | Refills: 0 | Status: SHIPPED | OUTPATIENT
Start: 2025-08-26 | End: 2025-08-29

## 2025-08-26 RX ORDER — LIDOCAINE HYDROCHLORIDE 20 MG/ML
5 SOLUTION OROPHARYNGEAL ONCE
Status: COMPLETED | OUTPATIENT
Start: 2025-08-26 | End: 2025-08-26

## 2025-08-26 RX ADMIN — ALUMINUM HYDROXIDE, MAGNESIUM HYDROXIDE, AND SIMETHICONE 15 ML: 200; 200; 20 SUSPENSION ORAL at 03:56

## 2025-08-26 RX ADMIN — ONDANSETRON 4 MG: 2 INJECTION, SOLUTION INTRAMUSCULAR; INTRAVENOUS at 01:55

## 2025-08-26 RX ADMIN — HYDROMORPHONE HYDROCHLORIDE 0.5 MG: 1 INJECTION, SOLUTION INTRAMUSCULAR; INTRAVENOUS; SUBCUTANEOUS at 01:55

## 2025-08-26 RX ADMIN — IOHEXOL 91 ML: 350 INJECTION, SOLUTION INTRAVENOUS at 02:10

## 2025-08-26 RX ADMIN — SODIUM CHLORIDE 1000 ML: 0.9 INJECTION, SOLUTION INTRAVENOUS at 01:55

## 2025-08-26 RX ADMIN — LIDOCAINE HYDROCHLORIDE 5 ML: 20 SOLUTION ORAL at 03:56

## 2025-08-26 ASSESSMENT — ACTIVITIES OF DAILY LIVING (ADL)
ADLS_ACUITY_SCORE: 41